# Patient Record
Sex: FEMALE | Race: WHITE | Employment: OTHER
[De-identification: names, ages, dates, MRNs, and addresses within clinical notes are randomized per-mention and may not be internally consistent; named-entity substitution may affect disease eponyms.]

---

## 2023-09-30 ENCOUNTER — HOSPITAL ENCOUNTER (EMERGENCY)
Facility: HOSPITAL | Age: 63
Discharge: HOME | End: 2023-10-01
Attending: EMERGENCY MEDICINE
Payer: COMMERCIAL

## 2023-09-30 ENCOUNTER — APPOINTMENT (OUTPATIENT)
Facility: HOSPITAL | Age: 63
End: 2023-09-30
Payer: COMMERCIAL

## 2023-09-30 VITALS
WEIGHT: 95 LBS | HEIGHT: 65 IN | HEART RATE: 77 BPM | SYSTOLIC BLOOD PRESSURE: 150 MMHG | OXYGEN SATURATION: 97 % | DIASTOLIC BLOOD PRESSURE: 92 MMHG | TEMPERATURE: 98.5 F | BODY MASS INDEX: 15.83 KG/M2 | RESPIRATION RATE: 21 BRPM

## 2023-09-30 DIAGNOSIS — R07.89 OTHER CHEST PAIN: Primary | ICD-10-CM

## 2023-09-30 DIAGNOSIS — R07.89 CHEST WALL PAIN: ICD-10-CM

## 2023-09-30 LAB
ALBUMIN SERPL-MCNC: 3 G/DL (ref 3.4–5)
ALBUMIN/GLOB SERPL: 0.8 (ref 0.8–1.7)
ALP SERPL-CCNC: 217 U/L (ref 45–117)
ALT SERPL-CCNC: 26 U/L (ref 13–56)
ANION GAP SERPL CALC-SCNC: 10 MMOL/L (ref 3–18)
AST SERPL-CCNC: 17 U/L (ref 10–38)
BASOPHILS # BLD: 0.1 K/UL (ref 0–0.1)
BASOPHILS NFR BLD: 1 % (ref 0–2)
BILIRUB SERPL-MCNC: 0.2 MG/DL (ref 0.2–1)
BUN SERPL-MCNC: 66 MG/DL (ref 7–18)
BUN/CREAT SERPL: 14 (ref 12–20)
CALCIUM SERPL-MCNC: 9 MG/DL (ref 8.5–10.1)
CHLORIDE SERPL-SCNC: 106 MMOL/L (ref 100–111)
CO2 SERPL-SCNC: 24 MMOL/L (ref 21–32)
CREAT SERPL-MCNC: 4.61 MG/DL (ref 0.6–1.3)
DIFFERENTIAL METHOD BLD: ABNORMAL
EOSINOPHIL # BLD: 0.4 K/UL (ref 0–0.4)
EOSINOPHIL NFR BLD: 3 % (ref 0–5)
ERYTHROCYTE [DISTWIDTH] IN BLOOD BY AUTOMATED COUNT: 15.9 % (ref 11.6–14.5)
GLOBULIN SER CALC-MCNC: 3.7 G/DL (ref 2–4)
GLUCOSE SERPL-MCNC: 285 MG/DL (ref 74–99)
HCT VFR BLD AUTO: 38.1 % (ref 35–45)
HGB BLD-MCNC: 12.1 G/DL (ref 12–16)
IMM GRANULOCYTES # BLD AUTO: 0 K/UL (ref 0–0.04)
IMM GRANULOCYTES NFR BLD AUTO: 0 % (ref 0–0.5)
LYMPHOCYTES # BLD: 1.1 K/UL (ref 0.9–3.6)
LYMPHOCYTES NFR BLD: 10 % (ref 21–52)
MAGNESIUM SERPL-MCNC: 2.1 MG/DL (ref 1.6–2.6)
MCH RBC QN AUTO: 33.4 PG (ref 24–34)
MCHC RBC AUTO-ENTMCNC: 31.8 G/DL (ref 31–37)
MCV RBC AUTO: 105.2 FL (ref 78–100)
MONOCYTES # BLD: 0.8 K/UL (ref 0.05–1.2)
MONOCYTES NFR BLD: 8 % (ref 3–10)
NEUTS SEG # BLD: 8.7 K/UL (ref 1.8–8)
NEUTS SEG NFR BLD: 78 % (ref 40–73)
NRBC # BLD: 0 K/UL (ref 0–0.01)
NRBC BLD-RTO: 0 PER 100 WBC
PLATELET # BLD AUTO: 204 K/UL (ref 135–420)
PMV BLD AUTO: 10.3 FL (ref 9.2–11.8)
POTASSIUM SERPL-SCNC: 4.3 MMOL/L (ref 3.5–5.5)
PROT SERPL-MCNC: 6.7 G/DL (ref 6.4–8.2)
RBC # BLD AUTO: 3.62 M/UL (ref 4.2–5.3)
SODIUM SERPL-SCNC: 140 MMOL/L (ref 136–145)
TROPONIN I SERPL HS-MCNC: 29 NG/L (ref 0–54)
TROPONIN I SERPL HS-MCNC: 31 NG/L (ref 0–54)
WBC # BLD AUTO: 11.1 K/UL (ref 4.6–13.2)

## 2023-09-30 PROCEDURE — 2580000003 HC RX 258: Performed by: EMERGENCY MEDICINE

## 2023-09-30 PROCEDURE — 83735 ASSAY OF MAGNESIUM: CPT

## 2023-09-30 PROCEDURE — 84484 ASSAY OF TROPONIN QUANT: CPT

## 2023-09-30 PROCEDURE — 85025 COMPLETE CBC W/AUTO DIFF WBC: CPT

## 2023-09-30 PROCEDURE — 99285 EMERGENCY DEPT VISIT HI MDM: CPT

## 2023-09-30 PROCEDURE — 80053 COMPREHEN METABOLIC PANEL: CPT

## 2023-09-30 PROCEDURE — 94762 N-INVAS EAR/PLS OXIMTRY CONT: CPT

## 2023-09-30 PROCEDURE — 71045 X-RAY EXAM CHEST 1 VIEW: CPT

## 2023-09-30 RX ORDER — 0.9 % SODIUM CHLORIDE 0.9 %
500 INTRAVENOUS SOLUTION INTRAVENOUS ONCE
Status: COMPLETED | OUTPATIENT
Start: 2023-09-30 | End: 2023-09-30

## 2023-09-30 RX ADMIN — SODIUM CHLORIDE 500 ML: 9 INJECTION, SOLUTION INTRAVENOUS at 11:56

## 2023-09-30 NOTE — ED NOTES
Report called to [facility] report of discharge and instructions given to [facility], [place] LPN.         [address] RN  09/30/23 1845

## 2023-09-30 NOTE — ED PROVIDER NOTES
[facility] EMERGENCY DEPT  EMERGENCY DEPARTMENT ENCOUNTER    Patient Name: Claus Garcia  MRN: [id number]  YOB: 1960  Provider: Sarah Landry MD  PCP: Gabby Angeles DO   Time/Date of evaluation: 10:58 AM EDT on 9/30/23    History of Presenting Illness     Chief Complaint   Patient presents with    Chest Pain       History Provided By: Patient, EMS, and nursing home report     History Eber Soliman): Calus Garcia is a 60 y.o. female who presents to the emergency department by EMS from the [address] [place] [address] for chest pain ongoing for 1 day. EMS previously administered 1 nitro with no relief and 325 mg of aspirin orally. Patient states the chain is a tightness and a pressure is central and to the sternum no radiation. Nursing Notes were all reviewed and agreed with or any disagreements were addressed in the HPI. Past History     Past Medical History:  Past Medical History:   Diagnosis Date    Arthropathy     left knee    Asthma     seasonal    CAD (coronary artery disease)     Diabetes mellitus (HCC)     type 2- IDDM    Hypertension     Ketoacidosis     Kidney stone     Pyelonephritis     Sepsis ([address])     SVT (supraventricular tachycardia) ([address])     and afib    Urinary retention        Past Surgical History:  Past Surgical History:   Procedure Laterality Date    APPENDECTOMY      CHOLECYSTECTOMY         Family History:  Family History   Problem Relation Age of Onset    Hypertension Mother     Cancer Mother     Hypertension Father     Asthma Father        Social History:       Medications:  No current facility-administered medications for this encounter. No current outpatient medications on file. Allergies:   Allergies   Allergen Reactions    Iodine     Sulfa Antibiotics        Social Determinants of Health:  Social Determinants of Health     Tobacco Use: Not on file   Alcohol Use: Not on file   Financial Resource Strain: Not on file   Food Insecurity: Not on file

## 2023-09-30 NOTE — ED NOTES
Pt voices her chest pain now feels \"different\" describes as \"tight\" and moving up left shoulder. Dr. Jonn Caraballo made aware repeat EKG and lab draw.         [address] RN  09/30/23 9187

## 2023-09-30 NOTE — ED NOTES
Pt states to this nurse that her chest pain started yesterday was given nitro with no relief, she states that she informed the staff at [facility] yesterday. She states that she was instructed by the PA at [facility] she could call 911 herself if she felt that she needed to. Dr. Dinorah Bartlett updated regarding patients comment to the nurse. Pt tearful in room.            [address] RN  09/30/23 2866

## 2023-09-30 NOTE — ED TRIAGE NOTES
Pt brought into er from [facility] for complaints of chest pain x 1 day. Ems administered 1 nitro with no relief and 325mg asa,.

## 2023-10-01 LAB
EKG ATRIAL RATE: 73 BPM
EKG ATRIAL RATE: 77 BPM
EKG DIAGNOSIS: NORMAL
EKG DIAGNOSIS: NORMAL
EKG P AXIS: 16 DEGREES
EKG P AXIS: 21 DEGREES
EKG P-R INTERVAL: 204 MS
EKG P-R INTERVAL: 222 MS
EKG Q-T INTERVAL: 424 MS
EKG Q-T INTERVAL: 426 MS
EKG QRS DURATION: 128 MS
EKG QRS DURATION: 128 MS
EKG QTC CALCULATION (BAZETT): 469 MS
EKG QTC CALCULATION (BAZETT): 479 MS
EKG R AXIS: -39 DEGREES
EKG R AXIS: -42 DEGREES
EKG T AXIS: 86 DEGREES
EKG T AXIS: 87 DEGREES
EKG VENTRICULAR RATE: 73 BPM
EKG VENTRICULAR RATE: 77 BPM

## 2023-12-31 ENCOUNTER — APPOINTMENT (OUTPATIENT)
Facility: HOSPITAL | Age: 63
End: 2023-12-31
Payer: COMMERCIAL

## 2023-12-31 ENCOUNTER — HOSPITAL ENCOUNTER (EMERGENCY)
Facility: HOSPITAL | Age: 63
Discharge: HOME | End: 2023-12-31
Attending: EMERGENCY MEDICINE
Payer: COMMERCIAL

## 2023-12-31 VITALS
BODY MASS INDEX: 17.49 KG/M2 | HEIGHT: 65 IN | DIASTOLIC BLOOD PRESSURE: 77 MMHG | SYSTOLIC BLOOD PRESSURE: 183 MMHG | WEIGHT: 105 LBS | HEART RATE: 66 BPM | TEMPERATURE: 98.5 F | OXYGEN SATURATION: 98 % | RESPIRATION RATE: 18 BRPM

## 2023-12-31 DIAGNOSIS — R07.89 ATYPICAL CHEST PAIN: Primary | ICD-10-CM

## 2023-12-31 DIAGNOSIS — M79.10 COVID-19 LONG HAULER MANIFESTING CHRONIC MUSCLE PAIN: ICD-10-CM

## 2023-12-31 DIAGNOSIS — G89.29 COVID-19 LONG HAULER MANIFESTING CHRONIC MUSCLE PAIN: ICD-10-CM

## 2023-12-31 DIAGNOSIS — U09.9 COVID-19 LONG HAULER MANIFESTING CHRONIC MUSCLE PAIN: ICD-10-CM

## 2023-12-31 LAB
ALBUMIN SERPL-MCNC: 3.1 G/DL (ref 3.4–5)
ALBUMIN/GLOB SERPL: 1 (ref 0.8–1.7)
ALP SERPL-CCNC: 123 U/L (ref 45–117)
ALT SERPL-CCNC: 36 U/L (ref 13–56)
ANION GAP SERPL CALC-SCNC: 7 MMOL/L (ref 3–18)
AST SERPL-CCNC: 39 U/L (ref 10–38)
BASOPHILS # BLD: 0 K/UL (ref 0–0.1)
BASOPHILS NFR BLD: 0 % (ref 0–2)
BILIRUB SERPL-MCNC: 0.4 MG/DL (ref 0.2–1)
BUN SERPL-MCNC: 24 MG/DL (ref 7–18)
BUN/CREAT SERPL: 10 (ref 12–20)
CALCIUM SERPL-MCNC: 8.7 MG/DL (ref 8.5–10.1)
CHLORIDE SERPL-SCNC: 107 MMOL/L (ref 100–111)
CO2 SERPL-SCNC: 24 MMOL/L (ref 21–32)
CREAT SERPL-MCNC: 2.3 MG/DL (ref 0.6–1.3)
DIFFERENTIAL METHOD BLD: ABNORMAL
EKG ATRIAL RATE: 67 BPM
EKG DIAGNOSIS: NORMAL
EKG P AXIS: 41 DEGREES
EKG P-R INTERVAL: 198 MS
EKG Q-T INTERVAL: 408 MS
EKG QRS DURATION: 72 MS
EKG QTC CALCULATION (BAZETT): 431 MS
EKG R AXIS: -81 DEGREES
EKG T AXIS: 84 DEGREES
EKG VENTRICULAR RATE: 67 BPM
EOSINOPHIL # BLD: 0.3 K/UL (ref 0–0.4)
EOSINOPHIL NFR BLD: 2 % (ref 0–5)
ERYTHROCYTE [DISTWIDTH] IN BLOOD BY AUTOMATED COUNT: 18.8 % (ref 11.6–14.5)
GLOBULIN SER CALC-MCNC: 3.2 G/DL (ref 2–4)
GLUCOSE SERPL-MCNC: 164 MG/DL (ref 74–99)
HCT VFR BLD AUTO: 31.5 % (ref 35–45)
HGB BLD-MCNC: 9.8 G/DL (ref 12–16)
IMM GRANULOCYTES # BLD AUTO: 0.3 K/UL (ref 0–0.04)
IMM GRANULOCYTES NFR BLD AUTO: 3 % (ref 0–0.5)
LYMPHOCYTES # BLD: 2.7 K/UL (ref 0.9–3.6)
LYMPHOCYTES NFR BLD: 22 % (ref 21–52)
MAGNESIUM SERPL-MCNC: 1.9 MG/DL (ref 1.6–2.6)
MCH RBC QN AUTO: 33.3 PG (ref 24–34)
MCHC RBC AUTO-ENTMCNC: 31.1 G/DL (ref 31–37)
MCV RBC AUTO: 107.1 FL (ref 78–100)
MONOCYTES # BLD: 0.9 K/UL (ref 0.05–1.2)
MONOCYTES NFR BLD: 8 % (ref 3–10)
NEUTS SEG # BLD: 7.9 K/UL (ref 1.8–8)
NEUTS SEG NFR BLD: 65 % (ref 40–73)
NRBC # BLD: 0.02 K/UL (ref 0–0.01)
NRBC BLD-RTO: 0.2 PER 100 WBC
NT PRO BNP: 9680 PG/ML (ref 0–900)
PLATELET # BLD AUTO: 208 K/UL (ref 135–420)
PMV BLD AUTO: 10 FL (ref 9.2–11.8)
POTASSIUM SERPL-SCNC: 4.8 MMOL/L (ref 3.5–5.5)
PROT SERPL-MCNC: 6.3 G/DL (ref 6.4–8.2)
RBC # BLD AUTO: 2.94 M/UL (ref 4.2–5.3)
SODIUM SERPL-SCNC: 138 MMOL/L (ref 136–145)
TROPONIN I SERPL HS-MCNC: 49 NG/L (ref 0–54)
WBC # BLD AUTO: 12.1 K/UL (ref 4.6–13.2)

## 2023-12-31 PROCEDURE — 93005 ELECTROCARDIOGRAM TRACING: CPT | Performed by: EMERGENCY MEDICINE

## 2023-12-31 PROCEDURE — 6370000000 HC RX 637 (ALT 250 FOR IP): Performed by: EMERGENCY MEDICINE

## 2023-12-31 PROCEDURE — 80053 COMPREHEN METABOLIC PANEL: CPT

## 2023-12-31 PROCEDURE — 85025 COMPLETE CBC W/AUTO DIFF WBC: CPT

## 2023-12-31 PROCEDURE — 71045 X-RAY EXAM CHEST 1 VIEW: CPT

## 2023-12-31 PROCEDURE — 99285 EMERGENCY DEPT VISIT HI MDM: CPT

## 2023-12-31 PROCEDURE — 83880 ASSAY OF NATRIURETIC PEPTIDE: CPT

## 2023-12-31 PROCEDURE — 84484 ASSAY OF TROPONIN QUANT: CPT

## 2023-12-31 PROCEDURE — 83735 ASSAY OF MAGNESIUM: CPT

## 2023-12-31 RX ORDER — CYCLOBENZAPRINE HCL 10 MG
10 TABLET ORAL
Status: DISCONTINUED | OUTPATIENT
Start: 2023-12-31 | End: 2023-12-31 | Stop reason: HOSPADM

## 2023-12-31 RX ORDER — MORPHINE SULFATE 2 MG/ML
2 INJECTION, SOLUTION INTRAMUSCULAR; INTRAVENOUS
Status: DISCONTINUED | OUTPATIENT
Start: 2023-12-31 | End: 2023-12-31 | Stop reason: HOSPADM

## 2023-12-31 RX ORDER — ACETAMINOPHEN 325 MG/1
650 TABLET ORAL
Status: DISCONTINUED | OUTPATIENT
Start: 2023-12-31 | End: 2023-12-31 | Stop reason: HOSPADM

## 2023-12-31 RX ORDER — TRAMADOL HYDROCHLORIDE 50 MG/1
50 TABLET ORAL
Status: COMPLETED | OUTPATIENT
Start: 2023-12-31 | End: 2023-12-31

## 2023-12-31 RX ADMIN — TRAMADOL HYDROCHLORIDE 50 MG: 50 TABLET ORAL at 05:34

## 2023-12-31 ASSESSMENT — PAIN SCALES - GENERAL: PAINLEVEL_OUTOF10: 8

## 2023-12-31 ASSESSMENT — PAIN - FUNCTIONAL ASSESSMENT: PAIN_FUNCTIONAL_ASSESSMENT: 0-10

## 2023-12-31 NOTE — ED TRIAGE NOTES
Pt arrives via ems c/o chest pain x24 hours. Pt is a resident at [facility] rn give 2 nitro, 1 hydralazine, and 1 med they forgot the name of with no relief.

## 2023-12-31 NOTE — DISCHARGE INSTRUCTIONS
Patient has continued musculoskeletal chest pain associated with her COVID infection.  Would highly recommend more aggressive treatment of her discomfort at least consider using muscle relaxants to augment Tylenol for symptoms.

## 2023-12-31 NOTE — ED PROVIDER NOTES
EMERGENCY DEPARTMENT HISTORY AND PHYSICAL EXAM    Date: 12/31/2023  Patient Name: Rebeka Villegas    History of Presenting Illness     Chief Complaint   Patient presents with    Chest Pain         History Provided By: Patient and EMS    Additional History (Context):   4:57 AM EST  Rebeka Villegas is a 63 y.o. female with PMHX of hypertension, diabetes, CAD, chronic chest pain, COVID-19 infection, end-stage renal disease 3 days with dialysis kidney stones with ESBL E. coli infection, SVT, asthma who presents to the emergency department C/O Chest pain.  Paramedics were called to nursing home/refit hip facility for patient complaining of chest pain.  They found her sleeping resting comfortably easily awoken upon their arrival.  Duration of symptoms reportedly lasting over 24 hours.  EKG performed in the field shows sinus rhythm with PAC no ischemia.  Heart rate is normal but blood pressure elevated 160s to 180s over 90s in the field.  She complains of 2 out of 10 pain.  Patient transported and delivered bed 1 without incident.  Patient was given hydralazine x 1 and nitroglycerin x 2 prior to transport.  Patient describes chest pain as pain central but greater intensity 2 out of 10 nonexertional without radiation.  There is no increasing or decreasing factors.  Review of records patient had a pharmacologic nuclear stress test performed December 28, 3 days ago [facility] showing no evidence of inducible ischemia.  Patient was admitted December 19 for chest pain COVID-positive.  She stayed until December 29 essentially 24-36 hrs. ago discharged after unremarkable standard workup.  Patient has a dialysis shunt to the right upper chest wall.    Social History  No smoking drinking or drugs    Family History  Both mother and father with hypertension, mother with glaucoma and cataracts.  Hypertension, cancer, other family history listed below    PCP: Ronan Correia, DO    No current facility-administered medications for  their visit.         Diagnosis and Disposition       DISCHARGE NOTE:  ***  Rebeka Villegas's  results have been reviewed with her.  She has been counseled regarding her diagnosis, treatment, and plan.  She verbally conveys understanding and agreement of the signs, symptoms, diagnosis, treatment and prognosis and additionally agrees to follow up as discussed.  She also agrees with the care-plan and conveys that all of her questions have been answered.  I have also provided discharge instructions for her that include: educational information regarding their diagnosis and treatment, and list of reasons why they would want to return to the ED prior to their follow-up appointment, should her condition change. She has been provided with education for proper emergency department utilization.     CLINICAL IMPRESSION:    No diagnosis found.    PLAN:  1. D/C Home  2.      Medication List      You have not been prescribed any medications.       3. [facility]  _______________________________    This note was partially transcribed via voice recognition software. Although efforts have been made to catch any discrepancies, it may contain sound alike words, grammatical errors, or nonsensical words.

## 2024-01-01 ENCOUNTER — HOME CARE VISIT (OUTPATIENT)
Age: 64
End: 2024-01-01
Payer: MEDICARE

## 2024-01-01 VITALS
OXYGEN SATURATION: 100 % | TEMPERATURE: 97.3 F | SYSTOLIC BLOOD PRESSURE: 101 MMHG | HEART RATE: 96 BPM | RESPIRATION RATE: 14 BRPM | DIASTOLIC BLOOD PRESSURE: 64 MMHG

## 2024-01-01 VITALS
TEMPERATURE: 97.5 F | DIASTOLIC BLOOD PRESSURE: 96 MMHG | RESPIRATION RATE: 12 BRPM | OXYGEN SATURATION: 100 % | SYSTOLIC BLOOD PRESSURE: 149 MMHG | HEART RATE: 81 BPM

## 2024-01-01 VITALS
OXYGEN SATURATION: 97 % | HEART RATE: 93 BPM | DIASTOLIC BLOOD PRESSURE: 102 MMHG | RESPIRATION RATE: 12 BRPM | SYSTOLIC BLOOD PRESSURE: 162 MMHG | TEMPERATURE: 97.3 F

## 2024-01-01 VITALS
HEART RATE: 85 BPM | SYSTOLIC BLOOD PRESSURE: 145 MMHG | DIASTOLIC BLOOD PRESSURE: 93 MMHG | RESPIRATION RATE: 15 BRPM | OXYGEN SATURATION: 100 % | TEMPERATURE: 97.3 F

## 2024-01-01 VITALS
TEMPERATURE: 97.4 F | SYSTOLIC BLOOD PRESSURE: 129 MMHG | DIASTOLIC BLOOD PRESSURE: 74 MMHG | HEART RATE: 76 BPM | OXYGEN SATURATION: 100 % | RESPIRATION RATE: 16 BRPM

## 2024-01-01 PROCEDURE — G0299 HHS/HOSPICE OF RN EA 15 MIN: HCPCS

## 2024-01-01 PROCEDURE — G0156 HHCP-SVS OF AIDE,EA 15 MIN: HCPCS

## 2024-01-01 PROCEDURE — G0155 HHCP-SVS OF CSW,EA 15 MIN: HCPCS

## 2024-01-01 ASSESSMENT — ENCOUNTER SYMPTOMS
BOWEL INCONTINENCE: 1

## 2024-01-14 PROBLEM — N18.4 CKD (CHRONIC KIDNEY DISEASE) STAGE 4, GFR 15-29 ML/MIN (HCC): Status: ACTIVE | Noted: 2022-11-03

## 2024-01-14 PROBLEM — I50.33 ACUTE ON CHRONIC DIASTOLIC HEART FAILURE (HCC): Status: ACTIVE | Noted: 2022-03-11

## 2024-01-14 PROBLEM — E55.9 VITAMIN D DEFICIENCY: Status: ACTIVE | Noted: 2022-07-08

## 2024-01-14 PROBLEM — J96.01 ACUTE HYPOXEMIC RESPIRATORY FAILURE (HCC): Status: ACTIVE | Noted: 2022-07-21

## 2024-01-14 PROBLEM — D63.8 ANEMIA OF CHRONIC DISEASE: Status: ACTIVE | Noted: 2023-05-25

## 2024-01-14 PROBLEM — T50.2X5A DIURETIC-INDUCED HYPOKALEMIA: Status: ACTIVE | Noted: 2024-01-14

## 2024-01-14 PROBLEM — I50.32 CHRONIC DIASTOLIC HEART FAILURE (HCC): Status: ACTIVE | Noted: 2022-03-31

## 2024-01-14 PROBLEM — E87.6 DIURETIC-INDUCED HYPOKALEMIA: Status: ACTIVE | Noted: 2024-01-14

## 2024-01-14 PROBLEM — E11.10 DIABETIC KETOACIDOSIS WITHOUT COMA ASSOCIATED WITH TYPE 2 DIABETES MELLITUS (HCC): Status: ACTIVE | Noted: 2018-08-15

## 2024-01-14 PROBLEM — N10 ACUTE PYELONEPHRITIS: Status: ACTIVE | Noted: 2017-05-19

## 2024-01-14 PROBLEM — R79.89 ELEVATED BRAIN NATRIURETIC PEPTIDE (BNP) LEVEL: Status: ACTIVE | Noted: 2022-02-08

## 2024-01-14 PROBLEM — I25.10 CORONARY ATHEROSCLEROSIS: Status: ACTIVE | Noted: 2023-09-04

## 2024-01-14 PROBLEM — J81.1 PULMONARY EDEMA: Status: ACTIVE | Noted: 2023-08-23

## 2024-01-14 PROBLEM — K21.9 GERD (GASTROESOPHAGEAL REFLUX DISEASE): Status: ACTIVE | Noted: 2023-06-25

## 2024-01-14 PROBLEM — E78.5 HYPERLIPIDEMIA: Status: ACTIVE | Noted: 2022-03-11

## 2024-01-14 PROBLEM — I50.9 CHF (CONGESTIVE HEART FAILURE) (HCC): Status: ACTIVE | Noted: 2023-02-27

## 2024-01-14 PROBLEM — I62.9 INTRACRANIAL HEMORRHAGE (HCC): Status: ACTIVE | Noted: 2023-11-13

## 2024-01-14 PROBLEM — N20.0 NEPHROLITHIASIS: Status: ACTIVE | Noted: 2023-07-05

## 2024-01-14 PROBLEM — I47.10 PSVT (PAROXYSMAL SUPRAVENTRICULAR TACHYCARDIA): Status: ACTIVE | Noted: 2024-01-14

## 2024-01-14 PROBLEM — U07.1 COVID-19: Status: ACTIVE | Noted: 2023-12-19

## 2024-01-14 PROBLEM — E11.65 UNCONTROLLED TYPE 2 DIABETES MELLITUS WITH HYPERGLYCEMIA (HCC): Status: ACTIVE | Noted: 2022-04-02

## 2024-01-14 PROBLEM — N17.9 AKI (ACUTE KIDNEY INJURY) (HCC): Status: ACTIVE | Noted: 2018-08-15

## 2024-03-19 ENCOUNTER — APPOINTMENT (OUTPATIENT)
Facility: HOSPITAL | Age: 64
End: 2024-03-19
Payer: COMMERCIAL

## 2024-03-19 ENCOUNTER — HOSPITAL ENCOUNTER (EMERGENCY)
Facility: HOSPITAL | Age: 64
Discharge: HOME | End: 2024-03-19
Attending: EMERGENCY MEDICINE
Payer: COMMERCIAL

## 2024-03-19 VITALS
BODY MASS INDEX: 19.44 KG/M2 | TEMPERATURE: 98.4 F | DIASTOLIC BLOOD PRESSURE: 74 MMHG | WEIGHT: 116.84 LBS | OXYGEN SATURATION: 99 % | SYSTOLIC BLOOD PRESSURE: 115 MMHG | RESPIRATION RATE: 14 BRPM | HEART RATE: 54 BPM

## 2024-03-19 DIAGNOSIS — R07.9 CHEST PAIN, UNSPECIFIED TYPE: Primary | ICD-10-CM

## 2024-03-19 DIAGNOSIS — N18.6 ESRD ON HEMODIALYSIS (HCC): ICD-10-CM

## 2024-03-19 DIAGNOSIS — Z99.2 ESRD ON HEMODIALYSIS (HCC): ICD-10-CM

## 2024-03-19 DIAGNOSIS — N30.01 ACUTE CYSTITIS WITH HEMATURIA: ICD-10-CM

## 2024-03-19 LAB
ALBUMIN SERPL-MCNC: 2.1 G/DL (ref 3.4–5)
ALBUMIN/GLOB SERPL: 0.6 (ref 0.8–1.7)
ALP SERPL-CCNC: 176 U/L (ref 45–117)
ALT SERPL-CCNC: 11 U/L (ref 13–56)
ANION GAP SERPL CALC-SCNC: 6 MMOL/L (ref 3–18)
APPEARANCE UR: CLEAR
AST SERPL-CCNC: 16 U/L (ref 10–38)
BACTERIA URNS QL MICRO: ABNORMAL /HPF
BASOPHILS # BLD: 0.1 K/UL (ref 0–0.1)
BASOPHILS NFR BLD: 1 % (ref 0–2)
BILIRUB SERPL-MCNC: 0.3 MG/DL (ref 0.2–1)
BILIRUB UR QL: NEGATIVE
BUN SERPL-MCNC: 78 MG/DL (ref 7–18)
BUN/CREAT SERPL: 18 (ref 12–20)
CALCIUM SERPL-MCNC: 9 MG/DL (ref 8.5–10.1)
CHLORIDE SERPL-SCNC: 107 MMOL/L (ref 100–111)
CO2 SERPL-SCNC: 28 MMOL/L (ref 21–32)
COLOR UR: YELLOW
CREAT SERPL-MCNC: 4.22 MG/DL (ref 0.6–1.3)
DIFFERENTIAL METHOD BLD: ABNORMAL
EOSINOPHIL # BLD: 0.2 K/UL (ref 0–0.4)
EOSINOPHIL NFR BLD: 1 % (ref 0–5)
EPITH CASTS URNS QL MICRO: ABNORMAL /LPF (ref 0–5)
ERYTHROCYTE [DISTWIDTH] IN BLOOD BY AUTOMATED COUNT: 14.9 % (ref 11.6–14.5)
GLOBULIN SER CALC-MCNC: 3.8 G/DL (ref 2–4)
GLUCOSE BLD STRIP.AUTO-MCNC: 104 MG/DL (ref 70–110)
GLUCOSE SERPL-MCNC: 117 MG/DL (ref 74–99)
GLUCOSE UR STRIP.AUTO-MCNC: 250 MG/DL
HCT VFR BLD AUTO: 36.1 % (ref 35–45)
HGB BLD-MCNC: 11.4 G/DL (ref 12–16)
HGB UR QL STRIP: ABNORMAL
IMM GRANULOCYTES # BLD AUTO: 0.1 K/UL (ref 0–0.04)
IMM GRANULOCYTES NFR BLD AUTO: 1 % (ref 0–0.5)
KETONES UR QL STRIP.AUTO: NEGATIVE MG/DL
LEUKOCYTE ESTERASE UR QL STRIP.AUTO: ABNORMAL
LYMPHOCYTES # BLD: 1.2 K/UL (ref 0.9–3.6)
LYMPHOCYTES NFR BLD: 11 % (ref 21–52)
MCH RBC QN AUTO: 30.9 PG (ref 24–34)
MCHC RBC AUTO-ENTMCNC: 31.6 G/DL (ref 31–37)
MCV RBC AUTO: 97.8 FL (ref 78–100)
MONOCYTES # BLD: 0.9 K/UL (ref 0.05–1.2)
MONOCYTES NFR BLD: 8 % (ref 3–10)
NEUTS SEG # BLD: 8.8 K/UL (ref 1.8–8)
NEUTS SEG NFR BLD: 79 % (ref 40–73)
NITRITE UR QL STRIP.AUTO: NEGATIVE
NRBC # BLD: 0 K/UL (ref 0–0.01)
NRBC BLD-RTO: 0 PER 100 WBC
PH UR STRIP: 6.5 (ref 5–8)
PLATELET # BLD AUTO: 214 K/UL (ref 135–420)
PMV BLD AUTO: 11.1 FL (ref 9.2–11.8)
POTASSIUM SERPL-SCNC: 4.1 MMOL/L (ref 3.5–5.5)
PROT SERPL-MCNC: 5.9 G/DL (ref 6.4–8.2)
PROT UR STRIP-MCNC: 300 MG/DL
RBC # BLD AUTO: 3.69 M/UL (ref 4.2–5.3)
RBC #/AREA URNS HPF: ABNORMAL /HPF (ref 0–5)
SODIUM SERPL-SCNC: 141 MMOL/L (ref 136–145)
SP GR UR REFRACTOMETRY: 1.01 (ref 1–1.03)
TROPONIN I SERPL HS-MCNC: 32 NG/L (ref 0–54)
TROPONIN I SERPL HS-MCNC: 36 NG/L (ref 0–54)
UROBILINOGEN UR QL STRIP.AUTO: 0.2 EU/DL (ref 0.2–1)
WBC # BLD AUTO: 11.2 K/UL (ref 4.6–13.2)
WBC URNS QL MICRO: ABNORMAL /HPF (ref 0–5)
YEAST URNS QL MICRO: ABNORMAL

## 2024-03-19 PROCEDURE — 99285 EMERGENCY DEPT VISIT HI MDM: CPT

## 2024-03-19 PROCEDURE — 6360000002 HC RX W HCPCS: Performed by: PHYSICIAN ASSISTANT

## 2024-03-19 PROCEDURE — 71045 X-RAY EXAM CHEST 1 VIEW: CPT

## 2024-03-19 PROCEDURE — 87086 URINE CULTURE/COLONY COUNT: CPT

## 2024-03-19 PROCEDURE — 82962 GLUCOSE BLOOD TEST: CPT

## 2024-03-19 PROCEDURE — 2580000003 HC RX 258: Performed by: PHYSICIAN ASSISTANT

## 2024-03-19 PROCEDURE — 96365 THER/PROPH/DIAG IV INF INIT: CPT

## 2024-03-19 PROCEDURE — 80053 COMPREHEN METABOLIC PANEL: CPT

## 2024-03-19 PROCEDURE — 70450 CT HEAD/BRAIN W/O DYE: CPT

## 2024-03-19 PROCEDURE — 93005 ELECTROCARDIOGRAM TRACING: CPT | Performed by: EMERGENCY MEDICINE

## 2024-03-19 PROCEDURE — 81001 URINALYSIS AUTO W/SCOPE: CPT

## 2024-03-19 PROCEDURE — 93005 ELECTROCARDIOGRAM TRACING: CPT | Performed by: PHYSICIAN ASSISTANT

## 2024-03-19 PROCEDURE — 84484 ASSAY OF TROPONIN QUANT: CPT

## 2024-03-19 PROCEDURE — 85025 COMPLETE CBC W/AUTO DIFF WBC: CPT

## 2024-03-19 RX ORDER — CEPHALEXIN 500 MG/1
500 CAPSULE ORAL 2 TIMES DAILY
Qty: 14 CAPSULE | Refills: 0 | Status: ON HOLD | OUTPATIENT
Start: 2024-03-19

## 2024-03-19 RX ADMIN — CEFTRIAXONE 1000 MG: 1 INJECTION, POWDER, FOR SOLUTION INTRAMUSCULAR; INTRAVENOUS at 17:51

## 2024-03-19 ASSESSMENT — LIFESTYLE VARIABLES
HOW OFTEN DO YOU HAVE A DRINK CONTAINING ALCOHOL: NEVER
HOW MANY STANDARD DRINKS CONTAINING ALCOHOL DO YOU HAVE ON A TYPICAL DAY: PATIENT DOES NOT DRINK

## 2024-03-19 ASSESSMENT — PAIN - FUNCTIONAL ASSESSMENT
PAIN_FUNCTIONAL_ASSESSMENT: NONE - DENIES PAIN
PAIN_FUNCTIONAL_ASSESSMENT: NONE - DENIES PAIN

## 2024-03-19 NOTE — ED PROVIDER NOTES
[facility] EMERGENCY DEPT  EMERGENCY DEPARTMENT ENCOUNTER       Pt Name: Rebeka Villegas  MRN: [id number]  Birthdate 1960  Date of evaluation: 3/19/2024  PCP: Lester Minor MD  Note Started: 7:39 PM 3/19/24     CHIEF COMPLAINT       Chief Complaint   Patient presents with    Chest Pain        HISTORY OF PRESENT ILLNESS: 1 or more elements      History From: Patient  HPI Limitations: None  Chronic Conditions: paroxysmal A-fib, CKD, diabetes, ESRD on HD (T/Th/Sat)  Social Determinants affecting Dx or Tx: none      Rebeka Villegas is a 63 y.o. female who presents to ED c/o sharp chest pain that radiated to her right shoulder which began while patient was about 30 minutes into dialysis today.  Per EMS they were told by staff that patient is at her normal baseline mental status.  Patient denies any pain in her chest now.  Patient is poor historian, states her mouth is dry and just wants water.  She does report some pain in her legs but that is chronic.  Pt is bedbound, resides in [facility], left-sided hemiplegia with significant right-sided weakness as well.     Nursing Notes were all reviewed and agreed with or any disagreements were addressed in the HPI.    PAST HISTORY     Past Medical History:  Past Medical History:   Diagnosis Date    Arthropathy     left knee    Asthma     seasonal    Atrial fibrillation (HCC)     CAD (coronary artery disease)     Cerebral artery occlusion with cerebral infarction (HCC)     Chronic kidney disease     Diabetes mellitus (HCC)     type 2- IDDM    Hemiplegia affecting dominant side, post-stroke (HCC)     Hypertension     Ketoacidosis     Kidney stone     Pyelonephritis     Sepsis (HCC)     SVT (supraventricular tachycardia)     and afib    Urinary retention        Past Surgical History:  Past Surgical History:   Procedure Laterality Date    APPENDECTOMY      CHOLECYSTECTOMY      XR MIDLINE EQUAL OR GREATER THAN 5 YEARS  7/25/2022    XR MIDLINE EQUAL OR GREATER

## 2024-03-19 NOTE — ED TRIAGE NOTES
Pt to room 2 from dialysis for evaluation of chest pain, and pain to right shoulder. Pt completed about 30 mins of her session when she felt sharp pain to middle of chest. EMS reports pt had similar pain last week during dialysis.  Denies SOB denies nausea.

## 2024-03-21 ENCOUNTER — APPOINTMENT (OUTPATIENT)
Facility: HOSPITAL | Age: 64
DRG: 871 | End: 2024-03-21
Payer: MEDICARE

## 2024-03-21 ENCOUNTER — HOSPITAL ENCOUNTER (EMERGENCY)
Facility: HOSPITAL | Age: 64
Discharge: HOME | DRG: 871 | End: 2024-03-22
Payer: MEDICARE

## 2024-03-21 DIAGNOSIS — B37.41 CANDIDA CYSTITIS: ICD-10-CM

## 2024-03-21 DIAGNOSIS — N39.0 ACUTE UTI: ICD-10-CM

## 2024-03-21 DIAGNOSIS — R60.0 EDEMA OF RIGHT UPPER ARM: ICD-10-CM

## 2024-03-21 DIAGNOSIS — R33.9 URINARY RETENTION: Primary | ICD-10-CM

## 2024-03-21 LAB
ALBUMIN SERPL-MCNC: 2.1 G/DL (ref 3.4–5)
ALBUMIN/GLOB SERPL: 0.5 (ref 0.8–1.7)
ALP SERPL-CCNC: 179 U/L (ref 45–117)
ALT SERPL-CCNC: 13 U/L (ref 13–56)
ANION GAP SERPL CALC-SCNC: 9 MMOL/L (ref 3–18)
APPEARANCE UR: ABNORMAL
AST SERPL-CCNC: 21 U/L (ref 10–38)
BACTERIA SPEC CULT: NORMAL
BACTERIA URNS QL MICRO: ABNORMAL /HPF
BASOPHILS # BLD: 0.1 K/UL (ref 0–0.1)
BASOPHILS NFR BLD: 0 % (ref 0–2)
BILIRUB SERPL-MCNC: 0.5 MG/DL (ref 0.2–1)
BILIRUB UR QL: NEGATIVE
BUN SERPL-MCNC: 50 MG/DL (ref 7–18)
BUN/CREAT SERPL: 15 (ref 12–20)
CALCIUM SERPL-MCNC: 8.8 MG/DL (ref 8.5–10.1)
CHLORIDE SERPL-SCNC: 107 MMOL/L (ref 100–111)
CO2 SERPL-SCNC: 22 MMOL/L (ref 21–32)
COLOR UR: YELLOW
CREAT SERPL-MCNC: 3.36 MG/DL (ref 0.6–1.3)
DIFFERENTIAL METHOD BLD: ABNORMAL
EOSINOPHIL # BLD: 0.1 K/UL (ref 0–0.4)
EOSINOPHIL NFR BLD: 1 % (ref 0–5)
EPITH CASTS URNS QL MICRO: ABNORMAL /LPF (ref 0–5)
ERYTHROCYTE [DISTWIDTH] IN BLOOD BY AUTOMATED COUNT: 15.1 % (ref 11.6–14.5)
FLUAV RNA SPEC QL NAA+PROBE: NOT DETECTED
FLUBV RNA SPEC QL NAA+PROBE: NOT DETECTED
GLOBULIN SER CALC-MCNC: 4.6 G/DL (ref 2–4)
GLUCOSE SERPL-MCNC: 179 MG/DL (ref 74–99)
GLUCOSE UR STRIP.AUTO-MCNC: NEGATIVE MG/DL
HCT VFR BLD AUTO: 35.1 % (ref 35–45)
HGB BLD-MCNC: 10.9 G/DL (ref 12–16)
HGB UR QL STRIP: ABNORMAL
IMM GRANULOCYTES # BLD AUTO: 0 K/UL (ref 0–0.04)
IMM GRANULOCYTES NFR BLD AUTO: 0 % (ref 0–0.5)
KETONES UR QL STRIP.AUTO: NEGATIVE MG/DL
LEUKOCYTE ESTERASE UR QL STRIP.AUTO: ABNORMAL
LYMPHOCYTES # BLD: 1.2 K/UL (ref 0.9–3.6)
LYMPHOCYTES NFR BLD: 10 % (ref 21–52)
MAGNESIUM SERPL-MCNC: 1.9 MG/DL (ref 1.6–2.6)
MCH RBC QN AUTO: 30.5 PG (ref 24–34)
MCHC RBC AUTO-ENTMCNC: 31.1 G/DL (ref 31–37)
MCV RBC AUTO: 98.3 FL (ref 78–100)
MONOCYTES # BLD: 0.8 K/UL (ref 0.05–1.2)
MONOCYTES NFR BLD: 7 % (ref 3–10)
NEUTS SEG # BLD: 9.1 K/UL (ref 1.8–8)
NEUTS SEG NFR BLD: 82 % (ref 40–73)
NITRITE UR QL STRIP.AUTO: NEGATIVE
NRBC # BLD: 0 K/UL (ref 0–0.01)
NRBC BLD-RTO: 0 PER 100 WBC
NT PRO BNP: 7816 PG/ML (ref 0–900)
PH UR STRIP: 5 (ref 5–8)
PLATELET # BLD AUTO: 176 K/UL (ref 135–420)
PMV BLD AUTO: 11.6 FL (ref 9.2–11.8)
POTASSIUM SERPL-SCNC: 4.9 MMOL/L (ref 3.5–5.5)
PROT SERPL-MCNC: 6.7 G/DL (ref 6.4–8.2)
PROT UR STRIP-MCNC: 300 MG/DL
RBC # BLD AUTO: 3.57 M/UL (ref 4.2–5.3)
RBC #/AREA URNS HPF: ABNORMAL /HPF (ref 0–5)
SARS-COV-2 RNA RESP QL NAA+PROBE: NOT DETECTED
SERVICE CMNT-IMP: NORMAL
SODIUM SERPL-SCNC: 138 MMOL/L (ref 136–145)
SP GR UR REFRACTOMETRY: 1.01 (ref 1–1.03)
TROPONIN I SERPL HS-MCNC: 24 NG/L (ref 0–54)
TROPONIN I SERPL HS-MCNC: 28 NG/L (ref 0–54)
UROBILINOGEN UR QL STRIP.AUTO: 0.2 EU/DL (ref 0.2–1)
WBC # BLD AUTO: 11.2 K/UL (ref 4.6–13.2)
WBC URNS QL MICRO: ABNORMAL /HPF (ref 0–5)
YEAST URNS QL MICRO: ABNORMAL

## 2024-03-21 PROCEDURE — 99285 EMERGENCY DEPT VISIT HI MDM: CPT

## 2024-03-21 PROCEDURE — 83880 ASSAY OF NATRIURETIC PEPTIDE: CPT

## 2024-03-21 PROCEDURE — 93971 EXTREMITY STUDY: CPT

## 2024-03-21 PROCEDURE — 84484 ASSAY OF TROPONIN QUANT: CPT

## 2024-03-21 PROCEDURE — 87186 SC STD MICRODIL/AGAR DIL: CPT

## 2024-03-21 PROCEDURE — 85025 COMPLETE CBC W/AUTO DIFF WBC: CPT

## 2024-03-21 PROCEDURE — 93005 ELECTROCARDIOGRAM TRACING: CPT | Performed by: EMERGENCY MEDICINE

## 2024-03-21 PROCEDURE — 51702 INSERT TEMP BLADDER CATH: CPT

## 2024-03-21 PROCEDURE — 80053 COMPREHEN METABOLIC PANEL: CPT

## 2024-03-21 PROCEDURE — 81001 URINALYSIS AUTO W/SCOPE: CPT

## 2024-03-21 PROCEDURE — 83735 ASSAY OF MAGNESIUM: CPT

## 2024-03-21 PROCEDURE — 71045 X-RAY EXAM CHEST 1 VIEW: CPT

## 2024-03-21 PROCEDURE — 87088 URINE BACTERIA CULTURE: CPT

## 2024-03-21 PROCEDURE — 96361 HYDRATE IV INFUSION ADD-ON: CPT

## 2024-03-21 PROCEDURE — 2580000003 HC RX 258: Performed by: EMERGENCY MEDICINE

## 2024-03-21 PROCEDURE — 87086 URINE CULTURE/COLONY COUNT: CPT

## 2024-03-21 PROCEDURE — 74176 CT ABD & PELVIS W/O CONTRAST: CPT

## 2024-03-21 PROCEDURE — 87636 SARSCOV2 & INF A&B AMP PRB: CPT

## 2024-03-21 RX ORDER — 0.9 % SODIUM CHLORIDE 0.9 %
250 INTRAVENOUS SOLUTION INTRAVENOUS ONCE
Status: COMPLETED | OUTPATIENT
Start: 2024-03-21 | End: 2024-03-21

## 2024-03-21 RX ORDER — MORPHINE SULFATE 2 MG/ML
2 INJECTION, SOLUTION INTRAMUSCULAR; INTRAVENOUS
Status: COMPLETED | OUTPATIENT
Start: 2024-03-21 | End: 2024-03-22

## 2024-03-21 RX ADMIN — SODIUM CHLORIDE 250 ML: 9 INJECTION, SOLUTION INTRAVENOUS at 21:49

## 2024-03-21 ASSESSMENT — ENCOUNTER SYMPTOMS
SHORTNESS OF BREATH: 0
EYES NEGATIVE: 1
ALLERGIC/IMMUNOLOGIC NEGATIVE: 1
GASTROINTESTINAL NEGATIVE: 1

## 2024-03-21 NOTE — ED PROVIDER NOTES
[facility] EMERGENCY DEPT  EMERGENCY DEPARTMENT ENCOUNTER      Pt Name: Rebeka Villegas  MRN: [id number]  Birthdate 1960  Date of evaluation: 3/21/2024  Provider: ROLO Olson  1:04 AM    CHIEF COMPLAINT       Chief Complaint   Patient presents with    Arm Swelling         HISTORY OF PRESENT ILLNESS    Rebeka Villegas is a 63 y.o. female who presents to the emergency department with EMS.  Patient is a resident at [facility].  She was at dialysis and a call was made for 911 because she did not look well.  Patient was just recently seen and discharged from this facility for chest pain nonspecific.  EMS asked her if she was having any discomfort and she said yes where her chest but no more information.  She did not have a negative dialysis.  This EMS team is very familiar with patient and states that her right upper extremity appears much more swollen than her left.  Right side of her chest is where her dialysis catheter is placed.    HPI    Nursing Notes were reviewed.    REVIEW OF SYSTEMS       Review of Systems   Unable to perform ROS: Dementia   Constitutional:  Negative for fever.   HENT: Negative.     Eyes: Negative.    Respiratory:  Negative for shortness of breath.    Cardiovascular:  Positive for chest pain.   Gastrointestinal: Negative.    Endocrine: Negative.    Genitourinary: Negative.    Musculoskeletal: Negative.    Skin: Negative.    Allergic/Immunologic: Negative.    Neurological: Negative.    Hematological: Negative.    Psychiatric/Behavioral: Negative.         Except as noted above the remainder of the review of systems was reviewed and negative.       PAST MEDICAL HISTORY     Past Medical History:   Diagnosis Date    Arthropathy     left knee    Asthma     seasonal    Atrial fibrillation (HCC)     CAD (coronary artery disease)     Cerebral artery occlusion with cerebral infarction (HCC)     Chronic kidney disease     Diabetes mellitus (HCC)     type 2- IDDM    Hemiplegia affecting

## 2024-03-21 NOTE — ED TRIAGE NOTES
Patient presented to the ed with r arm swelling. Pt recently had a TDC placed on the right side and the arm swelling is new for the patient. Patient at this time not responsive connected to monitor. Care ongoing.

## 2024-03-22 VITALS
DIASTOLIC BLOOD PRESSURE: 71 MMHG | TEMPERATURE: 98.4 F | BODY MASS INDEX: 19.33 KG/M2 | OXYGEN SATURATION: 98 % | HEART RATE: 66 BPM | HEIGHT: 65 IN | SYSTOLIC BLOOD PRESSURE: 168 MMHG | WEIGHT: 116 LBS | RESPIRATION RATE: 14 BRPM

## 2024-03-22 LAB
ECHO BSA: 1.55 M2
EKG ATRIAL RATE: 66 BPM
EKG ATRIAL RATE: 68 BPM
EKG DIAGNOSIS: NORMAL
EKG DIAGNOSIS: NORMAL
EKG P AXIS: 63 DEGREES
EKG P AXIS: 84 DEGREES
EKG P-R INTERVAL: 198 MS
EKG P-R INTERVAL: 220 MS
EKG Q-T INTERVAL: 406 MS
EKG Q-T INTERVAL: 416 MS
EKG QRS DURATION: 82 MS
EKG QRS DURATION: 84 MS
EKG QTC CALCULATION (BAZETT): 431 MS
EKG QTC CALCULATION (BAZETT): 436 MS
EKG R AXIS: -39 DEGREES
EKG R AXIS: 254 DEGREES
EKG T AXIS: 66 DEGREES
EKG T AXIS: 66 DEGREES
EKG VENTRICULAR RATE: 66 BPM
EKG VENTRICULAR RATE: 68 BPM

## 2024-03-22 PROCEDURE — 6370000000 HC RX 637 (ALT 250 FOR IP): Performed by: EMERGENCY MEDICINE

## 2024-03-22 PROCEDURE — 96365 THER/PROPH/DIAG IV INF INIT: CPT

## 2024-03-22 PROCEDURE — 96375 TX/PRO/DX INJ NEW DRUG ADDON: CPT

## 2024-03-22 PROCEDURE — 2580000003 HC RX 258: Performed by: EMERGENCY MEDICINE

## 2024-03-22 PROCEDURE — 6360000002 HC RX W HCPCS: Performed by: EMERGENCY MEDICINE

## 2024-03-22 RX ORDER — CEFUROXIME AXETIL 250 MG/1
250 TABLET ORAL 2 TIMES DAILY
Qty: 14 TABLET | Refills: 0 | Status: ON HOLD | OUTPATIENT
Start: 2024-03-22 | End: 2024-03-29 | Stop reason: HOSPADM

## 2024-03-22 RX ORDER — FLUCONAZOLE 100 MG/1
200 TABLET ORAL
Status: COMPLETED | OUTPATIENT
Start: 2024-03-22 | End: 2024-03-22

## 2024-03-22 RX ORDER — FLUCONAZOLE 100 MG/1
200 TABLET ORAL DAILY
Qty: 14 TABLET | Refills: 0 | Status: ON HOLD | OUTPATIENT
Start: 2024-03-22 | End: 2024-03-29 | Stop reason: HOSPADM

## 2024-03-22 RX ADMIN — FLUCONAZOLE 200 MG: 100 TABLET ORAL at 01:36

## 2024-03-22 RX ADMIN — CEFTRIAXONE 1000 MG: 1 INJECTION, POWDER, FOR SOLUTION INTRAMUSCULAR; INTRAVENOUS at 01:36

## 2024-03-22 RX ADMIN — MORPHINE SULFATE 2 MG: 2 INJECTION, SOLUTION INTRAMUSCULAR; INTRAVENOUS at 01:42

## 2024-03-22 ASSESSMENT — PAIN DESCRIPTION - LOCATION
LOCATION: LEG
LOCATION: LEG

## 2024-03-22 ASSESSMENT — PAIN SCALES - GENERAL
PAINLEVEL_OUTOF10: 7
PAINLEVEL_OUTOF10: 7

## 2024-03-22 ASSESSMENT — PAIN DESCRIPTION - DESCRIPTORS
DESCRIPTORS: ACHING
DESCRIPTORS: ACHING

## 2024-03-22 ASSESSMENT — PAIN - FUNCTIONAL ASSESSMENT
PAIN_FUNCTIONAL_ASSESSMENT: NONE - DENIES PAIN
PAIN_FUNCTIONAL_ASSESSMENT: 0-10

## 2024-03-22 NOTE — ED NOTES
Attempted to straight cath patient for urine. Did not get any return. Diaper was wet when taken off.

## 2024-03-23 LAB
EKG ATRIAL RATE: 65 BPM
EKG DIAGNOSIS: NORMAL
EKG P AXIS: 35 DEGREES
EKG P-R INTERVAL: 188 MS
EKG Q-T INTERVAL: 422 MS
EKG QRS DURATION: 82 MS
EKG QTC CALCULATION (BAZETT): 438 MS
EKG R AXIS: 52 DEGREES
EKG T AXIS: 42 DEGREES
EKG VENTRICULAR RATE: 65 BPM

## 2024-03-24 ENCOUNTER — APPOINTMENT (OUTPATIENT)
Facility: HOSPITAL | Age: 64
DRG: 871 | End: 2024-03-24
Payer: MEDICARE

## 2024-03-24 ENCOUNTER — HOSPITAL ENCOUNTER (INPATIENT)
Facility: HOSPITAL | Age: 64
LOS: 5 days | Discharge: HOSPICE | DRG: 871 | End: 2024-03-29
Attending: INTERNAL MEDICINE | Admitting: INTERNAL MEDICINE
Payer: MEDICARE

## 2024-03-24 DIAGNOSIS — N18.6 ESRD ON HEMODIALYSIS (HCC): ICD-10-CM

## 2024-03-24 DIAGNOSIS — Z99.2 ESRD ON HEMODIALYSIS (HCC): ICD-10-CM

## 2024-03-24 DIAGNOSIS — N30.01 ACUTE CYSTITIS WITH HEMATURIA: Primary | ICD-10-CM

## 2024-03-24 DIAGNOSIS — R53.1 GENERAL WEAKNESS: ICD-10-CM

## 2024-03-24 DIAGNOSIS — I69.30 HISTORY OF HEMORRHAGIC CEREBROVASCULAR ACCIDENT (CVA) WITH RESIDUAL DEFICIT: ICD-10-CM

## 2024-03-24 PROBLEM — R65.10 SIRS (SYSTEMIC INFLAMMATORY RESPONSE SYNDROME) (HCC): Status: ACTIVE | Noted: 2024-03-24

## 2024-03-24 LAB
ALBUMIN SERPL-MCNC: 2.2 G/DL (ref 3.4–5)
ALBUMIN/GLOB SERPL: 0.5 (ref 0.8–1.7)
ALP SERPL-CCNC: 196 U/L (ref 45–117)
ALT SERPL-CCNC: 12 U/L (ref 13–56)
ANION GAP SERPL CALC-SCNC: 14 MMOL/L (ref 3–18)
APPEARANCE UR: ABNORMAL
AST SERPL-CCNC: 23 U/L (ref 10–38)
BACTERIA SPEC CULT: ABNORMAL
BACTERIA URNS QL MICRO: ABNORMAL /HPF
BASOPHILS # BLD: 0 K/UL (ref 0–0.1)
BASOPHILS NFR BLD: 0 % (ref 0–2)
BILIRUB SERPL-MCNC: 0.4 MG/DL (ref 0.2–1)
BILIRUB UR QL: ABNORMAL
BUN SERPL-MCNC: 54 MG/DL (ref 7–18)
BUN/CREAT SERPL: 16 (ref 12–20)
CALCIUM SERPL-MCNC: 9.2 MG/DL (ref 8.5–10.1)
CC UR VC: ABNORMAL
CHLORIDE SERPL-SCNC: 101 MMOL/L (ref 100–111)
CK SERPL-CCNC: 209 U/L (ref 26–192)
CO2 SERPL-SCNC: 23 MMOL/L (ref 21–32)
COLOR UR: ABNORMAL
CREAT SERPL-MCNC: 3.29 MG/DL (ref 0.6–1.3)
DIFFERENTIAL METHOD BLD: ABNORMAL
EOSINOPHIL # BLD: 0 K/UL (ref 0–0.4)
EOSINOPHIL NFR BLD: 0 % (ref 0–5)
EPITH CASTS URNS QL MICRO: ABNORMAL /LPF (ref 0–5)
ERYTHROCYTE [DISTWIDTH] IN BLOOD BY AUTOMATED COUNT: 15.2 % (ref 11.6–14.5)
GLOBULIN SER CALC-MCNC: 4.2 G/DL (ref 2–4)
GLUCOSE BLD STRIP.AUTO-MCNC: 134 MG/DL (ref 70–110)
GLUCOSE SERPL-MCNC: 143 MG/DL (ref 74–99)
GLUCOSE UR STRIP.AUTO-MCNC: 100 MG/DL
HCT VFR BLD AUTO: 38.7 % (ref 35–45)
HGB BLD-MCNC: 11.8 G/DL (ref 12–16)
HGB UR QL STRIP: ABNORMAL
IMM GRANULOCYTES # BLD AUTO: 0.1 K/UL (ref 0–0.04)
IMM GRANULOCYTES NFR BLD AUTO: 1 % (ref 0–0.5)
KETONES UR QL STRIP.AUTO: 15 MG/DL
LEUKOCYTE ESTERASE UR QL STRIP.AUTO: ABNORMAL
LYMPHOCYTES # BLD: 1.2 K/UL (ref 0.9–3.6)
LYMPHOCYTES NFR BLD: 8 % (ref 21–52)
MCH RBC QN AUTO: 30.6 PG (ref 24–34)
MCHC RBC AUTO-ENTMCNC: 30.5 G/DL (ref 31–37)
MCV RBC AUTO: 100.5 FL (ref 78–100)
MONOCYTES # BLD: 1 K/UL (ref 0.05–1.2)
MONOCYTES NFR BLD: 7 % (ref 3–10)
NEUTS SEG # BLD: 12.5 K/UL (ref 1.8–8)
NEUTS SEG NFR BLD: 84 % (ref 40–73)
NITRITE UR QL STRIP.AUTO: NEGATIVE
NRBC # BLD: 0 K/UL (ref 0–0.01)
NRBC BLD-RTO: 0 PER 100 WBC
OTHER: ABNORMAL
PH UR STRIP: 5.5 (ref 5–8)
PLATELET # BLD AUTO: 202 K/UL (ref 135–420)
PMV BLD AUTO: 12.6 FL (ref 9.2–11.8)
POTASSIUM SERPL-SCNC: 4.4 MMOL/L (ref 3.5–5.5)
PROT SERPL-MCNC: 6.4 G/DL (ref 6.4–8.2)
PROT UR STRIP-MCNC: >300 MG/DL
RBC # BLD AUTO: 3.85 M/UL (ref 4.2–5.3)
RBC #/AREA URNS HPF: ABNORMAL /HPF (ref 0–5)
SERVICE CMNT-IMP: ABNORMAL
SODIUM SERPL-SCNC: 138 MMOL/L (ref 136–145)
SP GR UR REFRACTOMETRY: >1.03 (ref 1–1.03)
TROPONIN I SERPL HS-MCNC: 41 NG/L (ref 0–54)
UROBILINOGEN UR QL STRIP.AUTO: 0.2 EU/DL (ref 0.2–1)
WBC # BLD AUTO: 14.9 K/UL (ref 4.6–13.2)
WBC URNS QL MICRO: ABNORMAL /HPF (ref 0–5)

## 2024-03-24 PROCEDURE — 71045 X-RAY EXAM CHEST 1 VIEW: CPT

## 2024-03-24 PROCEDURE — 83036 HEMOGLOBIN GLYCOSYLATED A1C: CPT

## 2024-03-24 PROCEDURE — 87086 URINE CULTURE/COLONY COUNT: CPT

## 2024-03-24 PROCEDURE — 87040 BLOOD CULTURE FOR BACTERIA: CPT

## 2024-03-24 PROCEDURE — 2580000003 HC RX 258

## 2024-03-24 PROCEDURE — 99285 EMERGENCY DEPT VISIT HI MDM: CPT

## 2024-03-24 PROCEDURE — 84484 ASSAY OF TROPONIN QUANT: CPT

## 2024-03-24 PROCEDURE — 6360000002 HC RX W HCPCS

## 2024-03-24 PROCEDURE — 84145 PROCALCITONIN (PCT): CPT

## 2024-03-24 PROCEDURE — 85025 COMPLETE CBC W/AUTO DIFF WBC: CPT

## 2024-03-24 PROCEDURE — 82550 ASSAY OF CK (CPK): CPT

## 2024-03-24 PROCEDURE — 82962 GLUCOSE BLOOD TEST: CPT

## 2024-03-24 PROCEDURE — 6360000002 HC RX W HCPCS: Performed by: PHYSICIAN ASSISTANT

## 2024-03-24 PROCEDURE — 1100000003 HC PRIVATE W/ TELEMETRY

## 2024-03-24 PROCEDURE — 80053 COMPREHEN METABOLIC PANEL: CPT

## 2024-03-24 PROCEDURE — 2580000003 HC RX 258: Performed by: PHYSICIAN ASSISTANT

## 2024-03-24 PROCEDURE — 81001 URINALYSIS AUTO W/SCOPE: CPT

## 2024-03-24 RX ORDER — INSULIN LISPRO 100 [IU]/ML
0-6 INJECTION, SOLUTION INTRAVENOUS; SUBCUTANEOUS
Status: ON HOLD | COMMUNITY
Start: 2023-11-17 | End: 2024-03-29 | Stop reason: HOSPADM

## 2024-03-24 RX ORDER — FOLIC ACID 1 MG/1
1 TABLET ORAL NIGHTLY
Status: ON HOLD | COMMUNITY
End: 2024-03-29 | Stop reason: HOSPADM

## 2024-03-24 RX ORDER — DEXTROSE MONOHYDRATE 100 MG/ML
INJECTION, SOLUTION INTRAVENOUS CONTINUOUS PRN
Status: DISCONTINUED | OUTPATIENT
Start: 2024-03-24 | End: 2024-03-29

## 2024-03-24 RX ORDER — ISOSORBIDE MONONITRATE 60 MG/1
30 TABLET, EXTENDED RELEASE ORAL DAILY
COMMUNITY
Start: 2023-11-28

## 2024-03-24 RX ORDER — LOSARTAN POTASSIUM 50 MG/1
100 TABLET ORAL DAILY
Status: ON HOLD | COMMUNITY
Start: 2023-11-28 | End: 2024-03-29 | Stop reason: HOSPADM

## 2024-03-24 RX ORDER — INSULIN LISPRO 100 [IU]/ML
0-8 INJECTION, SOLUTION INTRAVENOUS; SUBCUTANEOUS
Status: DISCONTINUED | OUTPATIENT
Start: 2024-03-25 | End: 2024-03-29

## 2024-03-24 RX ORDER — PREGABALIN 50 MG/1
50 CAPSULE ORAL 2 TIMES DAILY
Status: ON HOLD | COMMUNITY
End: 2024-03-29 | Stop reason: HOSPADM

## 2024-03-24 RX ORDER — ATORVASTATIN CALCIUM 10 MG/1
10 TABLET, FILM COATED ORAL NIGHTLY
Status: ON HOLD | COMMUNITY
End: 2024-03-29 | Stop reason: HOSPADM

## 2024-03-24 RX ORDER — INSULIN GLARGINE 100 [IU]/ML
12 INJECTION, SOLUTION SUBCUTANEOUS DAILY
Status: ON HOLD | COMMUNITY
Start: 2023-11-22 | End: 2024-03-29 | Stop reason: HOSPADM

## 2024-03-24 RX ORDER — MIRTAZAPINE 15 MG/1
7.5 TABLET, FILM COATED ORAL NIGHTLY
COMMUNITY

## 2024-03-24 RX ORDER — LIDOCAINE 4 G/G
1 PATCH TOPICAL EVERY 24 HOURS
COMMUNITY
Start: 2023-08-18

## 2024-03-24 RX ORDER — HYDRALAZINE HYDROCHLORIDE 25 MG/1
37.5 TABLET, FILM COATED ORAL 3 TIMES DAILY
Status: ON HOLD | COMMUNITY
Start: 2024-01-12 | End: 2024-03-29 | Stop reason: HOSPADM

## 2024-03-24 RX ORDER — ACETAMINOPHEN 325 MG/1
650 TABLET ORAL EVERY 6 HOURS PRN
COMMUNITY

## 2024-03-24 RX ORDER — CARVEDILOL 25 MG/1
25 TABLET ORAL 2 TIMES DAILY WITH MEALS
Status: ON HOLD | COMMUNITY
Start: 2024-01-12 | End: 2024-03-29 | Stop reason: HOSPADM

## 2024-03-24 RX ORDER — BUMETANIDE 2 MG/1
2 TABLET ORAL DAILY
Status: ON HOLD | COMMUNITY
Start: 2023-12-29 | End: 2024-03-29 | Stop reason: HOSPADM

## 2024-03-24 RX ORDER — INSULIN LISPRO 100 [IU]/ML
0-4 INJECTION, SOLUTION INTRAVENOUS; SUBCUTANEOUS NIGHTLY
Status: DISCONTINUED | OUTPATIENT
Start: 2024-03-24 | End: 2024-03-29

## 2024-03-24 RX ORDER — OXYCODONE HYDROCHLORIDE 5 MG/1
5 TABLET ORAL EVERY 4 HOURS PRN
Status: ON HOLD | COMMUNITY
Start: 2023-09-25 | End: 2024-03-29 | Stop reason: HOSPADM

## 2024-03-24 RX ORDER — GLUCAGON 1 MG/ML
1 KIT INJECTION PRN
Status: DISCONTINUED | OUTPATIENT
Start: 2024-03-24 | End: 2024-03-29

## 2024-03-24 RX ORDER — OMEPRAZOLE 20 MG/1
20 CAPSULE, DELAYED RELEASE ORAL DAILY
COMMUNITY
Start: 2023-05-31

## 2024-03-24 RX ORDER — CALCIUM ACETATE 667 MG/1
667 TABLET ORAL
Status: ON HOLD | COMMUNITY
End: 2024-03-29 | Stop reason: HOSPADM

## 2024-03-24 RX ORDER — ERGOCALCIFEROL 1.25 MG/1
50000 CAPSULE ORAL WEEKLY
Status: ON HOLD | COMMUNITY
Start: 2023-08-18 | End: 2024-03-29 | Stop reason: HOSPADM

## 2024-03-24 RX ADMIN — DAPTOMYCIN 210 MG: 500 INJECTION, POWDER, LYOPHILIZED, FOR SOLUTION INTRAVENOUS at 22:30

## 2024-03-24 NOTE — ED TRIAGE NOTES
Pt arrives to ed reporting weakness, pt is a resident of [facility].

## 2024-03-24 NOTE — ED PROVIDER NOTES
[facility] EMERGENCY DEPT  EMERGENCY DEPARTMENT ENCOUNTER       Pt Name: Rebeka Villegas  MRN: [id number]  Birthdate 1960  Date of evaluation: 3/24/2024  PCP: Lester Minor MD  Note Started: 8:25 PM 3/24/24     CHIEF COMPLAINT       Chief Complaint   Patient presents with   • Fatigue        HISTORY OF PRESENT ILLNESS: 1 or more elements      History From: Patient  HPI Limitations: None  Chronic Conditions: Hypertension, hyperlipidemia, ESRD on hemodialysis, intracranial bleed in November 2023  Social Determinants affecting Dx or Tx: none    Rebeka Villegas is a 63 y.o. female who presents to ED c/o fatigue, weakness and abnormal urine culture from ED visit 3 days ago. Records review reveals that patient was seen at [facility] on 3/5/2024, 3/12/2024 as well as here on 3/19/2024 and 3/21/2024 for various complaints of chest pain, shortness of breath, fatigue.  At most recent ED visit 3 days ago, she was seen here for right arm swelling, status post TDC placed on the right side, right upper extremity duplex was negative for DVT and she had a CT abdomen pelvis that showed no acute process.  She was diagnosed with UTI and urinary retention with over 700 cc of urine in her bladder, Medina was placed and she was prescribed Ceftin.  However ED provider who saw her called the nursing facility was told that patient was not \"doing well\" and that her urine culture grew greater than 100,000 colonies of vancomycin-resistant Enterococcus faecium, prompting them to return to the ER.     Nursing Notes were all reviewed and agreed with or any disagreements were addressed in the HPI.    PAST HISTORY     Past Medical History:  Past Medical History:   Diagnosis Date   • Arthropathy     left knee   • Asthma     seasonal   • Atrial fibrillation (HCC)    • CAD (coronary artery disease)    • Cerebral artery occlusion with cerebral infarction (HCC)    • Chronic kidney disease    • Diabetes mellitus (HCC)     type 2- IDDM   • Hemiplegia

## 2024-03-25 ENCOUNTER — APPOINTMENT (OUTPATIENT)
Facility: HOSPITAL | Age: 64
DRG: 871 | End: 2024-03-25
Payer: MEDICARE

## 2024-03-25 PROBLEM — N18.6 ESRD ON HEMODIALYSIS (HCC): Status: ACTIVE | Noted: 2024-03-25

## 2024-03-25 PROBLEM — I95.9 HYPOTENSION: Status: ACTIVE | Noted: 2024-03-25

## 2024-03-25 PROBLEM — R41.82 ALTERED MENTAL STATUS: Status: ACTIVE | Noted: 2024-03-25

## 2024-03-25 PROBLEM — I61.9 ICH (INTRACEREBRAL HEMORRHAGE) (HCC): Status: ACTIVE | Noted: 2023-11-13

## 2024-03-25 PROBLEM — Z99.2 ESRD ON HEMODIALYSIS (HCC): Status: ACTIVE | Noted: 2024-03-25

## 2024-03-25 PROBLEM — A41.9 SEPSIS (HCC): Status: ACTIVE | Noted: 2024-03-25

## 2024-03-25 PROBLEM — I48.91 ATRIAL FIBRILLATION (HCC): Status: ACTIVE | Noted: 2024-03-25

## 2024-03-25 LAB
ALBUMIN SERPL-MCNC: 1.8 G/DL (ref 3.4–5)
ALBUMIN/GLOB SERPL: 0.5 (ref 0.8–1.7)
ALP SERPL-CCNC: 147 U/L (ref 45–117)
ALT SERPL-CCNC: 12 U/L (ref 13–56)
AMMONIA PLAS-SCNC: 13 UMOL/L (ref 11–32)
ANION GAP SERPL CALC-SCNC: 9 MMOL/L (ref 3–18)
APTT PPP: 36.8 SEC (ref 23–36.4)
AST SERPL-CCNC: 18 U/L (ref 10–38)
BASOPHILS # BLD: 0 K/UL (ref 0–0.1)
BASOPHILS NFR BLD: 0 % (ref 0–2)
BILIRUB DIRECT SERPL-MCNC: 0.1 MG/DL (ref 0–0.2)
BILIRUB SERPL-MCNC: 0.4 MG/DL (ref 0.2–1)
BUN SERPL-MCNC: 62 MG/DL (ref 7–18)
BUN/CREAT SERPL: 16 (ref 12–20)
CA-I SERPL-SCNC: 1.19 MMOL/L (ref 1.12–1.32)
CALCIUM SERPL-MCNC: 8.5 MG/DL (ref 8.5–10.1)
CHLORIDE SERPL-SCNC: 104 MMOL/L (ref 100–111)
CK SERPL-CCNC: 154 U/L (ref 26–192)
CO2 SERPL-SCNC: 27 MMOL/L (ref 21–32)
CREAT SERPL-MCNC: 3.87 MG/DL (ref 0.6–1.3)
DIFFERENTIAL METHOD BLD: ABNORMAL
EOSINOPHIL # BLD: 0 K/UL (ref 0–0.4)
EOSINOPHIL NFR BLD: 0 % (ref 0–5)
ERYTHROCYTE [DISTWIDTH] IN BLOOD BY AUTOMATED COUNT: 14.9 % (ref 11.6–14.5)
EST. AVERAGE GLUCOSE BLD GHB EST-MCNC: 137 MG/DL
GLOBULIN SER CALC-MCNC: 3.5 G/DL (ref 2–4)
GLUCOSE BLD STRIP.AUTO-MCNC: 106 MG/DL (ref 70–110)
GLUCOSE BLD STRIP.AUTO-MCNC: 125 MG/DL (ref 70–110)
GLUCOSE BLD STRIP.AUTO-MCNC: 126 MG/DL (ref 70–110)
GLUCOSE BLD STRIP.AUTO-MCNC: 130 MG/DL (ref 70–110)
GLUCOSE BLD STRIP.AUTO-MCNC: 135 MG/DL (ref 70–110)
GLUCOSE BLD STRIP.AUTO-MCNC: 45 MG/DL (ref 70–110)
GLUCOSE BLD STRIP.AUTO-MCNC: 45 MG/DL (ref 70–110)
GLUCOSE BLD STRIP.AUTO-MCNC: 50 MG/DL (ref 70–110)
GLUCOSE BLD STRIP.AUTO-MCNC: 97 MG/DL (ref 70–110)
GLUCOSE SERPL-MCNC: 80 MG/DL (ref 74–99)
HBA1C MFR BLD: 6.4 % (ref 4.2–5.6)
HBV SURFACE AG SER QL: <0.1 INDEX
HBV SURFACE AG SER QL: NEGATIVE
HCT VFR BLD AUTO: 32.9 % (ref 35–45)
HGB BLD-MCNC: 10.3 G/DL (ref 12–16)
IMM GRANULOCYTES # BLD AUTO: 0 K/UL (ref 0–0.04)
IMM GRANULOCYTES NFR BLD AUTO: 0 % (ref 0–0.5)
INR PPP: 1.3 (ref 0.9–1.1)
LACTATE SERPL-SCNC: 0.8 MMOL/L (ref 0.4–2)
LYMPHOCYTES # BLD: 0.8 K/UL (ref 0.9–3.6)
LYMPHOCYTES NFR BLD: 7 % (ref 21–52)
MAGNESIUM SERPL-MCNC: 2 MG/DL (ref 1.6–2.6)
MCH RBC QN AUTO: 30.7 PG (ref 24–34)
MCHC RBC AUTO-ENTMCNC: 31.3 G/DL (ref 31–37)
MCV RBC AUTO: 98.2 FL (ref 78–100)
MONOCYTES # BLD: 0.8 K/UL (ref 0.05–1.2)
MONOCYTES NFR BLD: 7 % (ref 3–10)
NEUTS SEG # BLD: 8.9 K/UL (ref 1.8–8)
NEUTS SEG NFR BLD: 85 % (ref 40–73)
NRBC # BLD: 0 K/UL (ref 0–0.01)
NRBC BLD-RTO: 0 PER 100 WBC
PHOSPHATE SERPL-MCNC: 4.8 MG/DL (ref 2.5–4.9)
PLATELET # BLD AUTO: 186 K/UL (ref 135–420)
PMV BLD AUTO: 11.7 FL (ref 9.2–11.8)
POTASSIUM SERPL-SCNC: 3.9 MMOL/L (ref 3.5–5.5)
PROCALCITONIN SERPL-MCNC: 1.01 NG/ML
PROT SERPL-MCNC: 5.3 G/DL (ref 6.4–8.2)
PROTHROMBIN TIME: 16.4 SEC (ref 11.9–14.7)
RBC # BLD AUTO: 3.35 M/UL (ref 4.2–5.3)
SODIUM SERPL-SCNC: 140 MMOL/L (ref 136–145)
TROPONIN I SERPL HS-MCNC: 60 NG/L (ref 0–54)
WBC # BLD AUTO: 10.5 K/UL (ref 4.6–13.2)

## 2024-03-25 PROCEDURE — 6370000000 HC RX 637 (ALT 250 FOR IP): Performed by: INTERNAL MEDICINE

## 2024-03-25 PROCEDURE — 2580000003 HC RX 258: Performed by: INTERNAL MEDICINE

## 2024-03-25 PROCEDURE — 86706 HEP B SURFACE ANTIBODY: CPT

## 2024-03-25 PROCEDURE — 2000000000 HC ICU R&B

## 2024-03-25 PROCEDURE — 36415 COLL VENOUS BLD VENIPUNCTURE: CPT

## 2024-03-25 PROCEDURE — 87340 HEPATITIS B SURFACE AG IA: CPT

## 2024-03-25 PROCEDURE — 82140 ASSAY OF AMMONIA: CPT

## 2024-03-25 PROCEDURE — 70450 CT HEAD/BRAIN W/O DYE: CPT

## 2024-03-25 PROCEDURE — 82962 GLUCOSE BLOOD TEST: CPT

## 2024-03-25 PROCEDURE — 84484 ASSAY OF TROPONIN QUANT: CPT

## 2024-03-25 PROCEDURE — 82550 ASSAY OF CK (CPK): CPT

## 2024-03-25 PROCEDURE — 83605 ASSAY OF LACTIC ACID: CPT

## 2024-03-25 PROCEDURE — 5A1D70Z PERFORMANCE OF URINARY FILTRATION, INTERMITTENT, LESS THAN 6 HOURS PER DAY: ICD-10-PCS | Performed by: INTERNAL MEDICINE

## 2024-03-25 PROCEDURE — 99223 1ST HOSP IP/OBS HIGH 75: CPT | Performed by: INTERNAL MEDICINE

## 2024-03-25 PROCEDURE — 87040 BLOOD CULTURE FOR BACTERIA: CPT

## 2024-03-25 PROCEDURE — 85610 PROTHROMBIN TIME: CPT

## 2024-03-25 PROCEDURE — 84100 ASSAY OF PHOSPHORUS: CPT

## 2024-03-25 PROCEDURE — 80048 BASIC METABOLIC PNL TOTAL CA: CPT

## 2024-03-25 PROCEDURE — 6360000002 HC RX W HCPCS: Performed by: INTERNAL MEDICINE

## 2024-03-25 PROCEDURE — 85025 COMPLETE CBC W/AUTO DIFF WBC: CPT

## 2024-03-25 PROCEDURE — 83735 ASSAY OF MAGNESIUM: CPT

## 2024-03-25 PROCEDURE — 92610 EVALUATE SWALLOWING FUNCTION: CPT

## 2024-03-25 PROCEDURE — 82330 ASSAY OF CALCIUM: CPT

## 2024-03-25 PROCEDURE — 85730 THROMBOPLASTIN TIME PARTIAL: CPT

## 2024-03-25 PROCEDURE — 80076 HEPATIC FUNCTION PANEL: CPT

## 2024-03-25 RX ORDER — ALBUMIN (HUMAN) 12.5 G/50ML
25 SOLUTION INTRAVENOUS EVERY 6 HOURS PRN
Status: DISCONTINUED | OUTPATIENT
Start: 2024-03-25 | End: 2024-03-29

## 2024-03-25 RX ORDER — HYDRALAZINE HYDROCHLORIDE 25 MG/1
37.5 TABLET, FILM COATED ORAL 3 TIMES DAILY
Status: DISCONTINUED | OUTPATIENT
Start: 2024-03-25 | End: 2024-03-27

## 2024-03-25 RX ORDER — CARVEDILOL 12.5 MG/1
25 TABLET ORAL 2 TIMES DAILY WITH MEALS
Status: DISCONTINUED | OUTPATIENT
Start: 2024-03-25 | End: 2024-03-27

## 2024-03-25 RX ORDER — ATORVASTATIN CALCIUM 10 MG/1
10 TABLET, FILM COATED ORAL NIGHTLY
Status: DISCONTINUED | OUTPATIENT
Start: 2024-03-25 | End: 2024-03-29

## 2024-03-25 RX ORDER — SODIUM CHLORIDE 9 MG/ML
INJECTION, SOLUTION INTRAVENOUS CONTINUOUS
Status: DISCONTINUED | OUTPATIENT
Start: 2024-03-25 | End: 2024-03-26

## 2024-03-25 RX ORDER — CALCIUM ACETATE 667 MG/1
667 CAPSULE ORAL
Status: DISCONTINUED | OUTPATIENT
Start: 2024-03-25 | End: 2024-03-29

## 2024-03-25 RX ORDER — PREGABALIN 50 MG/1
50 CAPSULE ORAL DAILY
Status: DISCONTINUED | OUTPATIENT
Start: 2024-03-25 | End: 2024-03-29 | Stop reason: HOSPADM

## 2024-03-25 RX ORDER — LIDOCAINE 4 G/G
1 PATCH TOPICAL EVERY 24 HOURS
Status: DISCONTINUED | OUTPATIENT
Start: 2024-03-25 | End: 2024-03-29 | Stop reason: HOSPADM

## 2024-03-25 RX ORDER — MIRTAZAPINE 15 MG/1
7.5 TABLET, FILM COATED ORAL NIGHTLY
Status: DISCONTINUED | OUTPATIENT
Start: 2024-03-25 | End: 2024-03-28

## 2024-03-25 RX ORDER — ONDANSETRON 4 MG/1
4 TABLET, ORALLY DISINTEGRATING ORAL EVERY 8 HOURS PRN
Status: DISCONTINUED | OUTPATIENT
Start: 2024-03-25 | End: 2024-03-29 | Stop reason: HOSPADM

## 2024-03-25 RX ORDER — SODIUM CHLORIDE 0.9 % (FLUSH) 0.9 %
5-40 SYRINGE (ML) INJECTION PRN
Status: DISCONTINUED | OUTPATIENT
Start: 2024-03-25 | End: 2024-03-29 | Stop reason: HOSPADM

## 2024-03-25 RX ORDER — 0.9 % SODIUM CHLORIDE 0.9 %
500 INTRAVENOUS SOLUTION INTRAVENOUS ONCE
Status: DISCONTINUED | OUTPATIENT
Start: 2024-03-25 | End: 2024-03-25

## 2024-03-25 RX ORDER — ACETAMINOPHEN 325 MG/1
650 TABLET ORAL EVERY 6 HOURS PRN
Status: DISCONTINUED | OUTPATIENT
Start: 2024-03-25 | End: 2024-03-28 | Stop reason: SDUPTHER

## 2024-03-25 RX ORDER — INSULIN LISPRO 100 [IU]/ML
0-6 INJECTION, SOLUTION INTRAVENOUS; SUBCUTANEOUS
Status: DISCONTINUED | OUTPATIENT
Start: 2024-03-25 | End: 2024-03-28

## 2024-03-25 RX ORDER — BUMETANIDE 1 MG/1
2 TABLET ORAL DAILY
Status: DISCONTINUED | OUTPATIENT
Start: 2024-03-25 | End: 2024-03-25

## 2024-03-25 RX ORDER — SODIUM CHLORIDE 0.9 % (FLUSH) 0.9 %
5-40 SYRINGE (ML) INJECTION EVERY 12 HOURS SCHEDULED
Status: DISCONTINUED | OUTPATIENT
Start: 2024-03-25 | End: 2024-03-29 | Stop reason: HOSPADM

## 2024-03-25 RX ORDER — INSULIN GLARGINE 100 [IU]/ML
12 INJECTION, SOLUTION SUBCUTANEOUS DAILY
Status: DISCONTINUED | OUTPATIENT
Start: 2024-03-25 | End: 2024-03-27

## 2024-03-25 RX ORDER — SODIUM CHLORIDE 9 MG/ML
INJECTION, SOLUTION INTRAVENOUS PRN
Status: DISCONTINUED | OUTPATIENT
Start: 2024-03-25 | End: 2024-03-29

## 2024-03-25 RX ORDER — PANTOPRAZOLE SODIUM 40 MG/1
40 TABLET, DELAYED RELEASE ORAL
Status: DISCONTINUED | OUTPATIENT
Start: 2024-03-25 | End: 2024-03-28

## 2024-03-25 RX ORDER — ACETAMINOPHEN 325 MG/1
650 TABLET ORAL EVERY 6 HOURS PRN
Status: DISCONTINUED | OUTPATIENT
Start: 2024-03-25 | End: 2024-03-29 | Stop reason: HOSPADM

## 2024-03-25 RX ORDER — FOLIC ACID 1 MG/1
1 TABLET ORAL NIGHTLY
Status: DISCONTINUED | OUTPATIENT
Start: 2024-03-25 | End: 2024-03-29

## 2024-03-25 RX ORDER — 0.9 % SODIUM CHLORIDE 0.9 %
250 INTRAVENOUS SOLUTION INTRAVENOUS ONCE
Status: COMPLETED | OUTPATIENT
Start: 2024-03-25 | End: 2024-03-25

## 2024-03-25 RX ORDER — ONDANSETRON 2 MG/ML
4 INJECTION INTRAMUSCULAR; INTRAVENOUS EVERY 6 HOURS PRN
Status: DISCONTINUED | OUTPATIENT
Start: 2024-03-25 | End: 2024-03-29 | Stop reason: HOSPADM

## 2024-03-25 RX ORDER — OXYCODONE HYDROCHLORIDE 5 MG/1
5 TABLET ORAL EVERY 4 HOURS PRN
Status: DISCONTINUED | OUTPATIENT
Start: 2024-03-25 | End: 2024-03-29

## 2024-03-25 RX ORDER — ACETAMINOPHEN 650 MG/1
650 SUPPOSITORY RECTAL EVERY 6 HOURS PRN
Status: DISCONTINUED | OUTPATIENT
Start: 2024-03-25 | End: 2024-03-29 | Stop reason: HOSPADM

## 2024-03-25 RX ORDER — POLYETHYLENE GLYCOL 3350 17 G/17G
17 POWDER, FOR SOLUTION ORAL DAILY PRN
Status: DISCONTINUED | OUTPATIENT
Start: 2024-03-25 | End: 2024-03-29

## 2024-03-25 RX ORDER — ISOSORBIDE MONONITRATE 30 MG/1
30 TABLET, EXTENDED RELEASE ORAL DAILY
Status: DISCONTINUED | OUTPATIENT
Start: 2024-03-25 | End: 2024-03-29 | Stop reason: HOSPADM

## 2024-03-25 RX ORDER — LOSARTAN POTASSIUM 50 MG/1
100 TABLET ORAL DAILY
Status: DISCONTINUED | OUTPATIENT
Start: 2024-03-25 | End: 2024-03-27

## 2024-03-25 RX ORDER — DEXTROSE MONOHYDRATE 100 MG/ML
INJECTION, SOLUTION INTRAVENOUS CONTINUOUS
Status: DISCONTINUED | OUTPATIENT
Start: 2024-03-25 | End: 2024-03-26

## 2024-03-25 RX ADMIN — SODIUM CHLORIDE 250 ML: 9 INJECTION, SOLUTION INTRAVENOUS at 14:17

## 2024-03-25 RX ADMIN — GENTAMICIN SULFATE 160 MG: 40 INJECTION, SOLUTION INTRAMUSCULAR; INTRAVENOUS at 14:18

## 2024-03-25 RX ADMIN — DEXTROSE MONOHYDRATE 125 ML: 100 INJECTION, SOLUTION INTRAVENOUS at 16:31

## 2024-03-25 RX ADMIN — PANTOPRAZOLE SODIUM 40 MG: 40 TABLET, DELAYED RELEASE ORAL at 06:21

## 2024-03-25 RX ADMIN — APIXABAN 2.5 MG: 2.5 TABLET, FILM COATED ORAL at 02:37

## 2024-03-25 RX ADMIN — OXYCODONE 5 MG: 5 TABLET ORAL at 02:40

## 2024-03-25 RX ADMIN — INSULIN GLARGINE 12 UNITS: 100 INJECTION, SOLUTION SUBCUTANEOUS at 09:14

## 2024-03-25 RX ADMIN — DEXTROSE MONOHYDRATE: 100 INJECTION, SOLUTION INTRAVENOUS at 19:33

## 2024-03-25 RX ADMIN — SODIUM CHLORIDE, PRESERVATIVE FREE 10 ML: 5 INJECTION INTRAVENOUS at 21:12

## 2024-03-25 RX ADMIN — FOLIC ACID 1 MG: 1 TABLET ORAL at 02:37

## 2024-03-25 RX ADMIN — SODIUM CHLORIDE: 9 INJECTION, SOLUTION INTRAVENOUS at 16:19

## 2024-03-25 RX ADMIN — MEROPENEM 1000 MG: 1 INJECTION, POWDER, FOR SOLUTION INTRAVENOUS at 11:56

## 2024-03-25 RX ADMIN — DEXTROSE MONOHYDRATE 125 ML: 100 INJECTION, SOLUTION INTRAVENOUS at 19:17

## 2024-03-25 RX ADMIN — SODIUM CHLORIDE, PRESERVATIVE FREE 10 ML: 5 INJECTION INTRAVENOUS at 09:17

## 2024-03-25 ASSESSMENT — PAIN SCALES - PAIN ASSESSMENT IN ADVANCED DEMENTIA (PAINAD)
CONSOLABILITY: NO NEED TO CONSOLE
TOTALSCORE: 0
TOTALSCORE: 0
FACIALEXPRESSION: SMILING OR INEXPRESSIVE
BODYLANGUAGE: RELAXED
BREATHING: NORMAL
FACIALEXPRESSION: SMILING OR INEXPRESSIVE
CONSOLABILITY: NO NEED TO CONSOLE
BODYLANGUAGE: RELAXED
TOTALSCORE: 0
TOTALSCORE: 0
BODYLANGUAGE: RELAXED
BREATHING: NORMAL
FACIALEXPRESSION: SMILING OR INEXPRESSIVE
FACIALEXPRESSION: SMILING OR INEXPRESSIVE
BREATHING: NORMAL
BREATHING: NORMAL
BODYLANGUAGE: RELAXED

## 2024-03-25 ASSESSMENT — PAIN SCALES - GENERAL
PAINLEVEL_OUTOF10: 8
PAINLEVEL_OUTOF10: 0
PAINLEVEL_OUTOF10: 0

## 2024-03-25 ASSESSMENT — PAIN DESCRIPTION - LOCATION: LOCATION: GENERALIZED

## 2024-03-25 ASSESSMENT — PAIN DESCRIPTION - DESCRIPTORS: DESCRIPTORS: ACHING

## 2024-03-25 ASSESSMENT — PAIN DESCRIPTION - ORIENTATION: ORIENTATION: RIGHT;LEFT

## 2024-03-25 NOTE — PROGRESS NOTES
Bedside and Verbal shift change report given to Mishel RN (oncoming nurse) . Report included the following information Nurse Handoff Report, Index, Intake/Output, MAR, and Recent Results.

## 2024-03-25 NOTE — ACP (ADVANCE CARE PLANNING)
disease    1010: Called Rakan Montalvo/primary MPOA at [phone]. This is a business office (law firm). Katia is one of the names of the business. Message left requesting a call back.    Called Dottie Peace (cousin/secondary MPOA) at [phone]. No answer. Message left requesting call back    1035: Dottie Peace returned call. Introduced role of palliative care to the hospitalized patient. Ms Peace is Ms Villegas's cousin. She and Mr Montalvo (primary MPOA) are in regular conversation    Lives at [facility] rehab currently. She has been home very little recently due to multiple hospitalizations. She is  and her  has advanced dementia and is currently in hospice care at [facility]. The Rosette have two sons from whom they are currently estranged.     Brief medical update given including hypotension this morning. Ms Peace said that she has had multiple conversations with the patient and staff at [facility] abouto code status and that the patient had always been clear that she would accept all resuscitation options including CPR, electric shocks, and intubation/ventilation. Explained our concern about difficulty liberating from life support should she require that. Ms Peace will speak with Mr Montalvo and follow up. FULL CODE continues until any changes are made. Hospitalist and care management team updated.     1430: patient had been transferred to the ICU for decreased responsiveness and hypotension. BP normalized. Seen with Dr Vance Esquivel in 103. Remains pale. Slightly more alert than when seen earlier this morning. Was able to tell us that her  was in hospice after a stroke.    1500--rec'd call from Rakan Montalvo, primary MPOA. Brief medical update given. Discussed code status and the discussion with LELE Sinclair. Offered video call for Dottie and him if that would be helpful and he will consider and discuss with Dottie.    Disposition plan: to be determined. Anticipate she will  return to [facility]    Palliative care will continue to follow Rebeka William  and her family/MPOA during this hospitalization and support them as they make healthcare decisions and define goals of care.    Primary Palliative Diagnosis:   Lethargy  Stroke history  hypotension    Conversation Summary: see above  Resuscitation Status: FULL CODE    Completed Documentation:    [] Advance Directive  [] Portable DNR  [] POLST  [] [facility]  [x] No new documents completed    Racheal Valles RN, MSN  Palliative Medicine  [phone]

## 2024-03-25 NOTE — ED NOTES
TRANSFER - OUT REPORT:    Verbal report given to NAMAN Springer on Rebeka Villegas  being transferred to [address] [place] [address] for routine progression of patient care       Report consisted of patient's Situation, Background, Assessment and   Recommendations(SBAR).     Information from the following report(s) Nurse Handoff Report, Index, ED Encounter Summary, MAR, and Recent Results was reviewed with the receiving nurse.    Kinder Fall Assessment:                           Lines:   Peripheral IV 03/24/24 Left Antecubital (Active)   Site Assessment Clean, dry & intact 03/24/24 1954   Line Status Blood return noted 03/24/24 1954   Line Care Cap changed 03/24/24 1954   Phlebitis Assessment No symptoms 03/24/24 1954   Infiltration Assessment 0 03/24/24 1954        Opportunity for questions and clarification was provided.      Patient transported with:  Registered Nurse

## 2024-03-25 NOTE — CARE COORDINATION
03/25/24 1011   Service Assessment   Patient Orientation Unable to Assess  (patient asleep)   History Provided By Other (see comment)  (Admissions at [facility])   Primary Caregiver   (Nik Rajan)   Support Systems Other (Comment)   PCP Verified by CM Yes   Social/Functional History   Lives With   (long term care at [facility])   Occupation Other(comment)   Discharge Planning   Type of Residence Skilled Nursing Facility   Living Arrangements Other (Comment)   Current Services Prior To Admission None   DME Ordered? Other (comment)   Potential Assistance Purchasing Medications No   Patient expects to be discharged to: Long-term care   Services At/After Discharge   Transition of Care Consult (CM Consult) Long Term Care   Services At/After Discharge SLP   [place] Resource Information Provided? No   Mode of Transport at Discharge BLS   Condition of Participation: Discharge Planning   The Plan for Transition of Care is related to the following treatment goals: \" wanting to return back to LTC\"   The Patient and/or Patient Representative was provided with a Choice of Provider? Patient   The Patient and/Or Patient Representative agree with the Discharge Plan? Yes   Freedom of Choice list was provided with basic dialogue that supports the patient's individualized plan of care/goals, treatment preferences, and shares the quality data associated with the providers?  Yes       Patient asleep and unable to assess at this time. Information being reported from [facility] @ [facility]. SW reached out and LM for family, Dottie, to return call regarding DC planning. Patient attends dialysis T TH Sat and will have dialysis while in acute setting as needed. Palliative care to consult as well regarding goals of care, per MD notes, prognosis is poor. Referral placed for the [facility] to review. Plan to return to LTC when stable. SW to follow.

## 2024-03-25 NOTE — PROGRESS NOTES
Hospitalist Progress Note    Patient: Rebeka Villegas MRN: [id number]  CSN: [id number]    YOB: 1960  Age: 63 y.o.  Sex: female    DOA: 3/24/2024 LOS:  LOS: 1 day          Chief Complaint:    sepsis      Assessment/Plan   62 yo dialysis patient sent back to ER for positive urine cx with VRE, from NH      Sepsis  AMS  ESRD  UTI  Debility and frailty  Hypotension-hold hydralazine, coreg, cozaar, bumex    Monitor hemodynamics  Albumin PRN lower BP    Hold BP meds and diuretic    Transfer to ICU fro closer monitoring    Blood cx pending  Look for other source of sepsis  Broad spectrum IV abx for now per ID    CT head unrevealing    Albumin PRN low BP    VRE UTI  Now on daptomycin  Infectious diseases consult     End-stage renal disease on dialysis Tuesday Thursday Saturday -consult nephrology     Atrial fibrillation rate controlled  recent hemorrhagic stroke and recent PE  Continue Eliquis twice daily     Pulmonary edema   Pending dialysis on Tuesday  No hypoxia or Sob seen  Telemetry monitoring  Echocardiogram  Restrict fluids     Recent hemorrhage neurologic stroke with hemiplegia on right  Aspiration precatuion   Speech eval      DM-hold lantus as low intake and AMS  Sliding scale insulin      Sacral decub  Wound care consult     Poor prognosis   pallative care consulted and MPOA contacted  Full code per paper work    Nursing home patient  Disposition :  Active Hospital Problems    Diagnosis     Sepsis ([facility]) [A41.9]     Hypotension [I95.9]     ESRD on hemodialysis ([facility]) [N18.6, Z99.2]     Altered mental status [R41.82]     Atrial fibrillation ([facility]) [I48.91]     SIRS (systemic inflammatory response syndrome) ([facility]) [R65.10]     ICH (intracerebral hemorrhage) ([facility]) [I61.9]     Pulmonary edema [J81.1]     GERD (gastroesophageal reflux disease) [K21.9]     Anemia of chronic disease [D63.8]     Uncontrolled type 2 diabetes mellitus with hyperglycemia ([facility]) [E11.65]     Chronic diastolic CHF (congestive heart

## 2024-03-25 NOTE — PROGRESS NOTES
SPEECH LANGUAGE PATHOLOGY BEDSIDE SWALLOW EVALUATION  Recommendations:   Puree diet with liquids  Meds crushed in puree as able  Total feeding assistance  Aspiration precautions  HOB >45 degrees during all intake and for at least 30 min after intake  Small bites/sips, Feed slowly, alternating bites/sips   Oral care three times daily   Dysphagia treatment    Goals:  Patient will:  1. Tolerate PO trials with 0 s/sx overt distress in 4/5 trials in order to determine least restrictive diet  2. Utilize compensatory swallow strategies/maneuvers (decrease bite/sip, size/rate, alt. liq/sol) with min cues in 4/5 trials in order to increase safety and tolerance of PO intake.      Patient: Rebeka Villegas (63 y.o. female)  Date: 3/25/2024  Primary Diagnosis: General weakness [R53.1]  SIRS (systemic inflammatory response syndrome) ([facility]) [R65.10]  ESRD on hemodialysis (HCC) [N18.6, Z99.2]  Acute cystitis with hematuria [N30.01]  History of hemorrhagic cerebrovascular accident (CVA) with residual deficit [I69.30]       Precautions: Aspiration  PLOF: As per H&P  Evaluation Time: 1100  DYSPHAGIA ASSESSMENT:  Based on the objective data described below, the patient presents with moderate oral dysphagia. Patient seen today for clinical bedside swallow evaluation. She is alert but somewhat lethargic, requiring mod cues to follow instructions. She has hx of CVA with residual R side weakness. Does not speak much and inconsistent shakes/nods head to answer y/n questions. Noted dried secretions in oral cavity, so ST performed dependent oral care, which patient tolerated well. Lingual coordination is somewhat impaired and patient is missing several teeth. Dx trials include thin liquid +/- straw, puree, and soft and bite size solid. Patient tolerated all trials without overt s/sx of aspiration. Mastication/bolus formation was slow and inefficient, but patient able to clear puree residue with liquid wash. She was unable to adequately prepare

## 2024-03-25 NOTE — CONSULTS
Palliative Medicine Consult  [facility]: [phone])  [facility]: [phone]     Patient Name: Rebeka Villegas  YOB: 1960    Date of Initial Consult: 3/25/24  Reason for Consult: goals of care discussion/ACP/symptoms management  Requesting Provider:  Jevon Taveras MD   Primary Care Physician: Lester Minor MD      SUMMARY:     Rebeka Villegas is a 63 y.o. with a past history of hemorrhagic stroke with right-sided weakness, history of recent PE, atrial fibrillation, ESRD on hemodialysis, coronary artery disease, diabetes mellitus, hypertension and history of sepsis, who was admitted on 3/24/2024 from nursing home with a diagnosis of VRE UTI.  Patient initially presented to emergency room at the request for further treatment of VRE UTI that was diagnosed on previous ED visit.  Patient was started on daptomycin and infectious disease was consulted.  Nephrology was consulted for resumption of hemodialysis.  Patient has history of recent PE and was cleared to use Eliquis.  CT head was negative for any intracranial hemorrhage.  Patient subsequently developed hypotension and required ICU transfer.  Current medical issues leading to Palliative Medicine involvement include: To establish goals of care.    3/25/24: Patient was seen in presence of palliative care RN.  Patient opens eyes and follows some verbal commands.  Denies for having any pain.  Feeling tired.  No nausea or vomiting.  Mild right-sided upper extremity pain/discomfort present     HISTORY:     History obtained from: Medical records    CHIEF COMPLAINT: Was asked to come to the emergency room to be admitted for treatment of VRE UTI    HPI/SUBJECTIVE:    The patient is:   [x] Verbal and participatory: Limited and poor historian  [] Non-participatory due to:         PHYSICAL EXAM:     From RN flowsheet:  Wt Readings from Last 3 Encounters:   03/21/24 52.6 kg (116 lb)   03/19/24 53 kg (116 lb 13.5 oz)    Ammonia    Collection Time: 03/25/24  1:39 PM   Result Value Ref Range    Ammonia 13 11 - 32 UMOL/L   Protime-INR    Collection Time: 03/25/24  1:39 PM   Result Value Ref Range    Protime 16.4 (H) 11.9 - 14.7 sec    INR 1.3 (H) 0.9 - 1.1     APTT    Collection Time: 03/25/24  1:39 PM   Result Value Ref Range    APTT 36.8 (H) 23.0 - 36.4 SEC             Total time: 75 minutes    Disclaimer: Sections of this note are dictated using utilizing voice recognition software, which may have resulted in some phonetic based errors in grammar and contents. Even though attempts were made to correct all the mistakes, some may have been missed, and remained in the body of the document. If questions arise, please contact our department.

## 2024-03-25 NOTE — CONSULTS
03/25/24 0237    acetaminophen (TYLENOL) tablet 650 mg, 650 mg, Oral, Q6H PRN, Jevon Taveras MD    [Held by provider] atorvastatin (LIPITOR) tablet 10 mg, 10 mg, Oral, Nightly, Jevon Taveras MD    calcium acetate (PHOSLO) capsule 667 mg, 667 mg, Oral, TID , Jevon Taveras MD    [Held by provider] carvedilol (COREG) tablet 25 mg, 25 mg, Oral, BID , Jevon Taveras MD    folic acid (FOLVITE) tablet 1 mg, 1 mg, Oral, Nightly, Jevon Taveras MD, 1 mg at 03/25/24 0237    [Held by provider] hydrALAZINE (APRESOLINE) tablet 37.5 mg, 37.5 mg, Oral, TID, Jevon Taveras MD    [Held by provider] insulin glargine (LANTUS) injection vial 12 Units, 12 Units, SubCUTAneous, Daily, Jevon Taveras MD, 12 Units at 03/25/24 0914    insulin lispro (HUMALOG) injection vial 0-6 Units, 0-6 Units, SubCUTAneous, TID , Jevon Taveras MD    isosorbide mononitrate (IMDUR) extended release tablet 30 mg, 30 mg, Oral, Daily, Jevon Taveras MD    lidocaine 4 % external patch 1 patch, 1 patch, Topical, Q24H, Jevon Taveras MD, 1 patch at 03/25/24 0237    [Held by provider] losartan (COZAAR) tablet 100 mg, 100 mg, Oral, Daily, Jevon Taveras MD    mirtazapine (REMERON) tablet 7.5 mg, 7.5 mg, Oral, Nightly, Jevon Taveras MD    pantoprazole (PROTONIX) tablet 40 mg, 40 mg, Oral, QAM , Jevon Taveras MD, 40 mg at 03/25/24 0621    oxyCODONE (ROXICODONE) immediate release tablet 5 mg, 5 mg, Oral, Q4H PRN, Jevon Taveras MD, 5 mg at 03/25/24 0240    pregabalin (LYRICA) capsule 50 mg, 50 mg, Oral, Daily, Jevon Taveras MD    meropenem (MERREM) 1,000 mg in sodium chloride 0.9 % 100 mL IVPB (mini-bag), 1,000 mg, IntraVENous, Q24H, Vinicius Valentine MD, Stopped at 03/25/24 1315    albumin human 25% IV solution 25 g, 25 g, IntraVENous, Q6H PRN, Carlota Hale MD    0.9 % sodium chloride infusion, , IntraVENous, Continuous, Malaisamy,  Year: No       Allergies:     Allergies   Allergen Reactions    Latex Anaphylaxis and Hives     itching    Pt reports latex jean baptiste is fine    Ceftriaxone Other (See Comments)     Has tolerated in 2022, and 2024    Fish Allergy Shortness Of Breath    Hydrochlorothiazide Other (See Comments) and Hives    Penicillins Rash and Itching     Tolerates ceftriaxone 07/2022    Shellfish-Derived Products Shortness Of Breath    Cheese     Iodine     Potato     Sulfa Antibiotics     Tomato        Review of Systems:   Review of System is negative except per HPI    Physical Assessment:     Vitals:    03/25/24 1700   BP: 111/65   Pulse: 60   Resp: 11   Temp:    SpO2:        Intake/Output Summary (Last 24 hours) at 3/25/2024 1725  Last data filed at 3/25/2024 1619  Gross per 24 hour   Intake 600 ml   Output 30 ml   Net 570 ml       General Appearance: lethargic, not in pain  HEENT: no jaundice, moist oral mucosa  Chest: LS clear to auscultation bilaterally  Heart: S1/S2, no murmur, RRR  Abdomen: soft, nontender, BS active   : no flank tenderness, + jean baptiste catheter  Skin: intact, no rash  Extremity: no edema  Neuro: Lethargic  HD Access: Rt IJ TDC      Be Gordon MD  [facility]  Work Number: [id number]

## 2024-03-25 NOTE — H&P
History & Physical    Patient: Rebeka Villegas MRN: [id number]  CSN: [id number]    YOB: 1960  Age: 63 y.o.  Sex: female      DOA: 3/24/2024    Chief Complaint:   Chief Complaint   Patient presents with    Fatigue       Active Hospital Problems    Diagnosis Date Noted    SIRS (systemic inflammatory response syndrome) ([facility]) [R65.10] 03/24/2024    Pulmonary edema [J81.1] 08/23/2023    GERD (gastroesophageal reflux disease) [K21.9] 06/25/2023    Anemia of chronic disease [D63.8] 05/25/2023    Uncontrolled type 2 diabetes mellitus with hyperglycemia (HCC) [E11.65] 04/02/2022    Chronic diastolic heart failure ([facility]) [I50.32] 03/31/2022    Primary hypertension [I10] 03/17/2015          HPI:     Rebeka Villegas is a 63 y.o.  female who has history of recent hemorrhagic strokeWith chronic hemiplegia, atrial fibrillation, recent PE, cleared to start Eliquis, end-stage renal disease dialysis Tuesday Thursday Saturday presents to the emergency room as a callback due to urine analysis growing VRE  Patient initially came to the emergency room 3 days ago with complaints of chest pain that started during dialysis as well as dyspnea and urinary retention at that time a straight cath urine was done for 700 cc of urine and it was sent for culture she was pancultured and then sent back to her nursing home today she returned with leukocytosis and urine culture that is positive for VRE  Infectious disease was consulted and they recommended daptomycin IV every 48 hours    Past Medical History:   Diagnosis Date    Arthropathy     left knee    Asthma     seasonal    Atrial fibrillation (HCC)     CAD (coronary artery disease)     Cerebral artery occlusion with cerebral infarction (HCC)     Chronic kidney disease     Diabetes mellitus (HCC)     type 2- IDDM    Hemiplegia affecting dominant side, post-stroke (HCC)     Hypertension     Ketoacidosis     Kidney stone     Pyelonephritis     Sepsis (HCC)     SVT (supraventricular

## 2024-03-25 NOTE — CONSULTS
seen.   No evidence of acute cardiopulmonary process.     CT ABDOMEN PELVIS WO CONTRAST Additional Contrast? None  Result Date: 3/21/2024  EXAM: CT ABDOMEN PELVIS WO CONTRAST CLINICAL HISTORY: urinary retention INDICATION: urinary retention COMPARISON: 23 2421 CONTRAST:  None. TECHNIQUE: Thin axial images were obtained through the abdomen and pelvis. Coronal and sagittal reformats were generated. Oral contrast was not administered. CT dose reduction was achieved through use of a standardized protocol tailored for this examination and automatic exposure control for dose modulation. The absence of intravenous contrast material reduces the sensitivity for evaluation of visceral organs and vasculature including presence of small mass lesions, hemodynamically significant stenoses, dissections, mucosal abnormalities etc. FINDINGS: LOWER THORAX: Left basilar atelectasis and effusion. Moderate hiatal hernia LIVER/GALLBLADDER: No mass.  CBD is not dilated. SPLEEN/PANCREAS:  within normal limits. ADRENALS/KIDNEYS: Unremarkable. Renal cortical atrophy bilaterally. STOMACH: Unremarkable. SMALL BOWEL/COLON: Fecal stasis is moderate to severe. Colonic diverticulosis is extensive. PERITONEUM: No ascites or pneumoperitoneum. RETROPERITONEUM: No lymphadenopathy or aortic aneurysm. APPENDIX: Unremarkable. BLADDER/REPRODUCTIVE ORGANS: No acute process. BONES: Left hip arthroplasty. ADDITIONAL COMMENTS: N/A   No acute intraperitoneal process is identified. Minimal left basilar atelectasis/effusion. Examination limited secondary to artifact/absence of IV contrast. Incidental and/or nonemergent findings are as described in detail above.       LE Doppler 3/5/2024-  [place] Impression:   Technically difficult study due to altered mental state, body habitus, and   sub-optimal postioning. Right popliteal  vein not visualized.   1. No evidence of DVT in the right lower extremity veins visualized.   2. No evidence of DVT in the left common  femoral vein.      ECHO December 2023-  [place] Echo: Limited with Contrast Protocol  Narrative    Left Ventricle: Left ventricle size is normal. There is increased wall  thickness with normal left ventricle mass index consistent with concentric  remodeling. Normal wall motion. The ejection fraction by visual  approximation is 50 - 55%. There is low normal systolic function. Abnormal diastolic function. Indeterminate left ventricular filling pressure.    Right Ventricle: Right ventricle size is normal. Low normal systolic  function.  no evidence of right heart strain    Left Atrium: Left atrium is severely dilated.    Right Atrium: Right atrium is dilated. Catheter present in the right  atrium.    Tricuspid Valve: Moderate (2+) transvalvular regurgitation. Moderate  pulmonary artery hypertension. RVSP is 53.0 mmHg.    Mitral Valve: Mild (1+) transvalvular regurgitation.    IVC/SVC: IVC diameter is greater than 21 mm and decreases greater than 50% during inspiration; therefore the estimated right atrial pressure is intermediate (~8 mmHg).    Extracardiac: Left pleural effusion.     VQ scan 12/24/2023-  [place] Impression  Abnormal left lung uptake.  Differential includes a high probability for acute pulmonary emboli.  However, based on the comparison chest x-ray, asymmetric lung disease could also give a similar appearance.           Please note: Voice-recognition software may have been used to generate this report, which may have resulted in some phonetic-based errors in grammar and contents. Even though attempts were made to correct all the mistakes, some may have been missed, and remained in the body of the document.    Magnus Dowling MD  3/25/2024

## 2024-03-25 NOTE — CONSULTS
able to:   [x] Indicates understanding       [] Needs reinforcement  [] Unsuccessful      [] Verbal Understanding  [] Demonstrated understanding       [] No evidence of learning  [] Refused teaching         [] N/A       Electronically signed by Brandie Bronson RN on 3/25/2024 at 12:00 PM

## 2024-03-25 NOTE — PROGRESS NOTES
I was consulted for this patient . She goes in [facility] for dialysis with [facility]   Informed Dr Gordon for her further care while in house     Donal Lux MD  [facility]

## 2024-03-25 NOTE — PROGRESS NOTES
-Notified ICU nurse at bedside that the dialysis catheter dressing to the patient's right chest was replaced.   -The patient had an undated piece of gauze with tape over her dialysis catheter site. The site was cleaned with CHG. Central line dressing was used and placed over the site.   -The dialysis catheter is not listed in the patient avatar.

## 2024-03-25 NOTE — PROGRESS NOTES
TRANSFER - IN REPORT:    Verbal report received from Marcelino CORONEL RN on Rebeka Villegas  being received from ED for routine progression of patient care      Report consisted of patient's Situation, Background, Assessment and   Recommendations(SBAR).     Information from the following report(s) Nurse Handoff Report, Index, ED Encounter Summary, ED SBAR, Adult Overview, MAR, and Recent Results was reviewed with the receiving nurse.    Opportunity for questions and clarification was provided.      2200  Care assumed upon patient's arrival to unit. Med rec completed, assessmenet done and documented in flowsheet, safety measures in place.  Med rec completed using med list from nursing home, patient in bed awake, lethargic with contractures, A/O x 4, respirations unlabored, skin dry and warm with open area to sacrum (pressure ulcer), external hemorrhoids noted, moderate hand  noted to LUE extremity, denies any pain. PIV patent and saline locked. Other assessment findings documented in flowsheets. Side rails up x 3, bed locked and in lowest position, bed alarm on, call bell and bedside table within reach.     0140  Sent down for head CT.  0200  Arrived back to unit.

## 2024-03-25 NOTE — PROGRESS NOTES
1915 - Bedside report received from JAMA Georges. Patient not responding to any painful stimuli, lethargic, blood sugar 50, PRN D10 infusion initiated. Hospitalist paged.     1925 - Dr. Taveras notified, orders received to begin D10 continuous infusion.     1930 - Shift assessment complete. Lethargic, able to open eyes minimally, unable to tell me name, oxygen saturation beginning to drop to 80s, nasal canula placed at 3L which brought oxygen saturation back to 100%. Blood glucose rechecked, 135. Will continue to monitor.     2035 - Patient still remains lethargic but stable vital signs, Dr. Taveras notified of PO Eliquis order, orders received to hold all oral meds.     0000 - Reassessment complete. Patient able to start her name and date of birth with some garbled language in between, unable to follow commands with extremities. Resting comfortable.

## 2024-03-25 NOTE — PROGRESS NOTES
TRANSFER - OUT REPORT:    Verbal report given to Klaus on Rebeka Villegas  being transferred to ICU 3 for change in patient condition (low blood pressure, lethargy, oriented x2 self/place, poor attention, closer monitoring).    Report consisted of patient's Situation, Background, Assessment and   Recommendations(SBAR).     Information from the following report(s) Nurse Handoff Report, ED Encounter Summary, Adult Overview, Intake/Output, MAR, Recent Results, and Cardiac Rhythm NSR  was reviewed with the receiving nurse.           Lines:   Peripheral IV 03/24/24 Left Antecubital (Active)   Site Assessment Clean, dry & intact 03/25/24 0925   Line Status Flushed;Capped 03/25/24 0925   Line Care Connections checked and tightened;Ports disinfected 03/25/24 0925   Phlebitis Assessment No symptoms 03/25/24 0925   Infiltration Assessment 0 03/25/24 0925   Alcohol Cap Used Yes 03/25/24 0925   Dressing Status Clean, dry & intact 03/25/24 0925   Dressing Type Transparent 03/25/24 0925        Opportunity for questions and clarification was provided.      Patient transported with:  Monitor and Registered Nurse

## 2024-03-25 NOTE — PROGRESS NOTES
-Patient is lethargic. Glucose 126.  -Patient initially did not arouse to voice/BP at that time 79/42.   -Sternal rubbed patient. Patient opened eyes but closed during conversation/needed verbal stimulation to stay awake. Oriented to self and place. BP after interaction 95/51.  -PO medications held/patient cannot safely take.  -MD called and notified of concerns.

## 2024-03-25 NOTE — PROGRESS NOTES
Physician Progress Note      PATIENT:               BLANCA BRAR  CSN #:                  [id number]  :                       1960  ADMIT DATE:       3/24/2024 6:19 PM  DISCH DATE:  RESPONDING  PROVIDER #:        Jevon Taveras MD          QUERY TEXT:    Patient admitted with sepsis, noted to have atrial fibrillation and is   maintained on Eliquis. If possible, please document in progress notes and   discharge summary if you are evaluating and/or treating any of the following:    The medical record reflects the following:  Risk Factors: 63-year-old F, afib, anticoagulated    Clinical Indicators:  H&P- Atrial fibrillation recent hemorrhagic stroke and recent PE  Continue Eliquis twice daily    Treatment: Receiving Eliquis    Georgette Dubose, RN, BSN, CRCR  [address] [place] [id number]  [email or web address]  Options provided:  -- Secondary hypercoagulable state in a patient with atrial fibrillation  -- Other - I will add my own diagnosis  -- Disagree - Not applicable / Not valid  -- Disagree - Clinically unable to determine / Unknown  -- Refer to Clinical Documentation Reviewer    PROVIDER RESPONSE TEXT:    This patient has secondary hypercoagulable state in a patient with atrial   fibrillation.    Query created by: Georgette Dubose on 3/25/2024 1:50 PM      Electronically signed by:  Jevon Taveras MD 3/25/2024 7:42 PM

## 2024-03-25 NOTE — CONSULTS
[facility]  (A Division of [facility])      Consultation Note      Date of Admission: 3/24/2024      Date of Note: 3/25/2024      Reason for Referral: Sepsis        Current Antimicrobials:    Prior Antimicrobials:  Daptomycin IV (3/24-) #1        Assessment: Rec / Plan:   Sepsis  NOT due to VRE -- this bug generally doesn't move this fast/quickly; my Spidey-senses smell another bug, such as a GNR that would drop her BP to these levels and wipe her out.  She's had an ESBL GNR at [facility] in last couple of years (that was also SS to gentamicin).  I'd recommend PULM review patient for possible TOW to ICU for closer monitoring next few days.  I have added a single shot gentamicin 160mg IV (3mg/kg) and renal-dosed meropenem IV for now while we await more cultures to return. ->dapto #1    Add renal-dosed meropenem 1000mg IV daily + single dose of gentamicin 160mg IV    Await BCx return.  ICU TOW?    She's pretty ill.   ESRD/HD    Recent L Basal Ganglia CVA    DMT2 - great control A1c 6.4%    VRE UTI  Nicholas.  Being treated with daptomycin          Microbiology:  3/25 - BCx sent     3/24 - BCx sent      UA sent       3/21 - UA (+) VRE      Lines / Catheters: Tunneled RIJ/TDC and peripheral      HPI:  CC:  Nonverbal  Ms Villegas is a (old) 63y WF with underlying CVA hx DEC23 with L basal ganglia involvement who was recalled to ED 3/24 due to (+) VRE in Urine Cx from earlier ED visit 3/21 - and admitted, but on reviewing her chart, she has been hypotensive/non-verbal (to me) and weak overnight.  Sepsis?  Afebrile.  Has been dosed for VRE/daptomycin last night    Unknown Social Hx         Principal Problem:    SIRS (systemic inflammatory response syndrome) (HCC)  Active Problems:    Chronic diastolic heart failure (HCC)    GERD (gastroesophageal reflux disease)    Anemia of chronic disease    Primary hypertension    Pulmonary edema    Uncontrolled type 2 diabetes  [[id number]] Collected: 03/24/24 1830    Order Status: Sent Specimen: Blood Updated: 03/24/24 2157    Urine Culture [[id number]]  (Abnormal)  (Susceptibility) Collected: 03/21/24 2230    Order Status: Completed Specimen: Urine, clean catch Updated: 03/24/24 0752     Special Requests NO SPECIAL REQUESTS        [place] count --        >100,000  COLONIES/mL       Culture       Vancomycin resistant Enterococcus faecium          Susceptibility        Vancomycin Resistant Enterococcus faecium      BACTERIAL SUSCEPTIBILITY PANEL SANCHEZ      ampicillin >=32 ug/mL Resistant      ciprofloxacin >=8 ug/mL Resistant      DAPTOmycin 3 ug/mL Sensitive  [1]       levofloxacin >=8 ug/mL Resistant      linezolid 2 ug/mL Sensitive      nitrofurantoin 256 ug/mL Resistant      tetracycline >=16 ug/mL Resistant      vancomycin >=32 ug/mL Resistant                   [1]  Susceptible Dose Dependent  (SENSITIVITIES PERFORMED BY E-TEST)                     COVID-19 & Influenza Combo [[id number]] Collected: 03/21/24 1740    Order Status: Completed Specimen: Nasopharyngeal Updated: 03/21/24 1821     SARS-CoV-2, PCR Not detected        Comment: Not Detected results do not preclude SARS-CoV-2 infection and should not be used as the sole basis for patient management decisions.Results must be combined with clinical observations, patient history, and epidemiological information.        Rapid Influenza A By PCR Not detected        Rapid Influenza B By PCR Not detected        Comment: Testing was performed using mario Devika SARS-CoV-2 and Influenza A/B nucleic acid assay.  This test is a multiplex Real-Time Reverse Transcriptase Polymerase Chain Reaction (RT-PCR) based in vitro diagnostic test intended for the qualitative detection of nucleic acids from SARS-CoV-2, Influenza A, and Influenza B in nasopharyngeal for use under the FDA's Emergency Use Authorization(EAU) only.     Fact sheet for Patients: https://www.fda.gov/media/450131/download  Fact sheet

## 2024-03-26 ENCOUNTER — APPOINTMENT (OUTPATIENT)
Facility: HOSPITAL | Age: 64
DRG: 871 | End: 2024-03-26
Payer: MEDICARE

## 2024-03-26 LAB
ALBUMIN SERPL-MCNC: 1.5 G/DL (ref 3.4–5)
ALBUMIN/GLOB SERPL: 0.4 (ref 0.8–1.7)
ALP SERPL-CCNC: 138 U/L (ref 45–117)
ALT SERPL-CCNC: 9 U/L (ref 13–56)
ANION GAP SERPL CALC-SCNC: 8 MMOL/L (ref 3–18)
AST SERPL-CCNC: 13 U/L (ref 10–38)
BACTERIA SPEC CULT: ABNORMAL
BASOPHILS # BLD: 0 K/UL (ref 0–0.1)
BASOPHILS NFR BLD: 0 % (ref 0–2)
BILIRUB DIRECT SERPL-MCNC: <0.1 MG/DL (ref 0–0.2)
BILIRUB SERPL-MCNC: 0.2 MG/DL (ref 0.2–1)
BUN SERPL-MCNC: 63 MG/DL (ref 7–18)
BUN/CREAT SERPL: 16 (ref 12–20)
CA-I SERPL-SCNC: 1.13 MMOL/L (ref 1.12–1.32)
CALCIUM SERPL-MCNC: 8.1 MG/DL (ref 8.5–10.1)
CC UR VC: ABNORMAL
CHLORIDE SERPL-SCNC: 103 MMOL/L (ref 100–111)
CK SERPL-CCNC: 73 U/L (ref 26–192)
CO2 SERPL-SCNC: 26 MMOL/L (ref 21–32)
CREAT SERPL-MCNC: 4.06 MG/DL (ref 0.6–1.3)
DIFFERENTIAL METHOD BLD: ABNORMAL
EOSINOPHIL # BLD: 0.1 K/UL (ref 0–0.4)
EOSINOPHIL NFR BLD: 1 % (ref 0–5)
ERYTHROCYTE [DISTWIDTH] IN BLOOD BY AUTOMATED COUNT: 14.8 % (ref 11.6–14.5)
GLOBULIN SER CALC-MCNC: 3.4 G/DL (ref 2–4)
GLUCOSE BLD STRIP.AUTO-MCNC: 128 MG/DL (ref 70–110)
GLUCOSE BLD STRIP.AUTO-MCNC: 129 MG/DL (ref 70–110)
GLUCOSE BLD STRIP.AUTO-MCNC: 136 MG/DL (ref 70–110)
GLUCOSE BLD STRIP.AUTO-MCNC: 143 MG/DL (ref 70–110)
GLUCOSE BLD STRIP.AUTO-MCNC: 151 MG/DL (ref 70–110)
GLUCOSE BLD STRIP.AUTO-MCNC: 154 MG/DL (ref 70–110)
GLUCOSE SERPL-MCNC: 126 MG/DL (ref 74–99)
HBV SURFACE AB SER QL IA: POSITIVE
HBV SURFACE AB SERPL IA-ACNC: 67.76 MIU/ML
HCT VFR BLD AUTO: 30.1 % (ref 35–45)
HEP BS AB COMMENT: NORMAL
HGB BLD-MCNC: 9.3 G/DL (ref 12–16)
IMM GRANULOCYTES # BLD AUTO: 0 K/UL (ref 0–0.04)
IMM GRANULOCYTES NFR BLD AUTO: 0 % (ref 0–0.5)
LACTATE SERPL-SCNC: 1.5 MMOL/L (ref 0.4–2)
LYMPHOCYTES # BLD: 1.4 K/UL (ref 0.9–3.6)
LYMPHOCYTES NFR BLD: 17 % (ref 21–52)
MAGNESIUM SERPL-MCNC: 1.8 MG/DL (ref 1.6–2.6)
MCH RBC QN AUTO: 30.3 PG (ref 24–34)
MCHC RBC AUTO-ENTMCNC: 30.9 G/DL (ref 31–37)
MCV RBC AUTO: 98 FL (ref 78–100)
MONOCYTES # BLD: 0.9 K/UL (ref 0.05–1.2)
MONOCYTES NFR BLD: 11 % (ref 3–10)
NEUTS SEG # BLD: 5.6 K/UL (ref 1.8–8)
NEUTS SEG NFR BLD: 70 % (ref 40–73)
NRBC # BLD: 0 K/UL (ref 0–0.01)
NRBC BLD-RTO: 0 PER 100 WBC
PHOSPHATE SERPL-MCNC: 4.1 MG/DL (ref 2.5–4.9)
PLATELET # BLD AUTO: 154 K/UL (ref 135–420)
PMV BLD AUTO: 11.3 FL (ref 9.2–11.8)
POTASSIUM SERPL-SCNC: 3.3 MMOL/L (ref 3.5–5.5)
POTASSIUM SERPL-SCNC: 3.6 MMOL/L (ref 3.5–5.5)
PROT SERPL-MCNC: 4.9 G/DL (ref 6.4–8.2)
RBC # BLD AUTO: 3.07 M/UL (ref 4.2–5.3)
SERVICE CMNT-IMP: ABNORMAL
SODIUM SERPL-SCNC: 137 MMOL/L (ref 136–145)
T4 FREE SERPL-MCNC: 1.2 NG/DL (ref 0.7–1.5)
TSH SERPL DL<=0.05 MIU/L-ACNC: 1.36 UIU/ML (ref 0.36–3.74)
WBC # BLD AUTO: 8 K/UL (ref 4.6–13.2)

## 2024-03-26 PROCEDURE — 2580000003 HC RX 258: Performed by: INTERNAL MEDICINE

## 2024-03-26 PROCEDURE — 2700000000 HC OXYGEN THERAPY PER DAY

## 2024-03-26 PROCEDURE — 84443 ASSAY THYROID STIM HORMONE: CPT

## 2024-03-26 PROCEDURE — 2000000000 HC ICU R&B

## 2024-03-26 PROCEDURE — 90935 HEMODIALYSIS ONE EVALUATION: CPT

## 2024-03-26 PROCEDURE — 82550 ASSAY OF CK (CPK): CPT

## 2024-03-26 PROCEDURE — 82330 ASSAY OF CALCIUM: CPT

## 2024-03-26 PROCEDURE — 2580000003 HC RX 258: Performed by: PHYSICIAN ASSISTANT

## 2024-03-26 PROCEDURE — 6360000002 HC RX W HCPCS: Performed by: INTERNAL MEDICINE

## 2024-03-26 PROCEDURE — 80076 HEPATIC FUNCTION PANEL: CPT

## 2024-03-26 PROCEDURE — 83605 ASSAY OF LACTIC ACID: CPT

## 2024-03-26 PROCEDURE — 83735 ASSAY OF MAGNESIUM: CPT

## 2024-03-26 PROCEDURE — 84132 ASSAY OF SERUM POTASSIUM: CPT

## 2024-03-26 PROCEDURE — 2580000003 HC RX 258

## 2024-03-26 PROCEDURE — 85025 COMPLETE CBC W/AUTO DIFF WBC: CPT

## 2024-03-26 PROCEDURE — 6370000000 HC RX 637 (ALT 250 FOR IP): Performed by: INTERNAL MEDICINE

## 2024-03-26 PROCEDURE — 80048 BASIC METABOLIC PNL TOTAL CA: CPT

## 2024-03-26 PROCEDURE — 74018 RADEX ABDOMEN 1 VIEW: CPT

## 2024-03-26 PROCEDURE — 84100 ASSAY OF PHOSPHORUS: CPT

## 2024-03-26 PROCEDURE — 84439 ASSAY OF FREE THYROXINE: CPT

## 2024-03-26 PROCEDURE — 6360000002 HC RX W HCPCS

## 2024-03-26 RX ORDER — DEXTROSE AND SODIUM CHLORIDE 5; .9 G/100ML; G/100ML
INJECTION, SOLUTION INTRAVENOUS CONTINUOUS
Status: DISCONTINUED | OUTPATIENT
Start: 2024-03-26 | End: 2024-03-27

## 2024-03-26 RX ADMIN — DAPTOMYCIN 210 MG: 500 INJECTION, POWDER, LYOPHILIZED, FOR SOLUTION INTRAVENOUS at 22:30

## 2024-03-26 RX ADMIN — SODIUM CHLORIDE: 9 INJECTION, SOLUTION INTRAVENOUS at 08:18

## 2024-03-26 RX ADMIN — DEXTROSE AND SODIUM CHLORIDE: 5; 900 INJECTION, SOLUTION INTRAVENOUS at 10:15

## 2024-03-26 RX ADMIN — MEROPENEM 1000 MG: 1 INJECTION, POWDER, FOR SOLUTION INTRAVENOUS at 16:11

## 2024-03-26 RX ADMIN — APIXABAN 2.5 MG: 2.5 TABLET, FILM COATED ORAL at 21:02

## 2024-03-26 RX ADMIN — MIRTAZAPINE 7.5 MG: 15 TABLET, FILM COATED ORAL at 21:02

## 2024-03-26 RX ADMIN — FOLIC ACID 1 MG: 1 TABLET ORAL at 21:02

## 2024-03-26 RX ADMIN — DEXTROSE MONOHYDRATE: 100 INJECTION, SOLUTION INTRAVENOUS at 08:17

## 2024-03-26 RX ADMIN — SODIUM CHLORIDE, PRESERVATIVE FREE 10 ML: 5 INJECTION INTRAVENOUS at 21:00

## 2024-03-26 ASSESSMENT — PAIN SCALES - GENERAL
PAINLEVEL_OUTOF10: 0

## 2024-03-26 NOTE — PROGRESS NOTES
Speech-Language Pathology  Orders for re-evaluation received and chart reviewed. Attempted to see patient for diagnostic dysphagia therapy, but patient unable to open eyes or respond to max verbal and tactile stimulation including sternal rub. Recommend NPO with consideration of alternative means of nutrition if patient remains unable to maintain sufficient alertness for PO intake. D/w nursing. ST will continue to follow as appropriate.   Alicia Senior M.S., CCC-SLP

## 2024-03-26 NOTE — PLAN OF CARE
Discharge Instructions from  Inpatient Wound Care Nurse at [facility]   [address]   [address]  [place] [address]  [phone] Fax [phone]    NAME:  Rebeka Villegas  YOB: 1960  MEDICAL RECORD NUMBER:  [id number]  DATE:  3/24/2024    Wound Cleansing:   Do not scrub or use excessive force.  Cleanse wound prior to applying a clean dressing with:  [x] Normal Saline  or  [x] Wound Cleanser     Topical Treatments:  Do not apply lotions, creams, or ointments to wound bed unless directed.      Dressings:           Wound Location: Sacrum   [x] Apply Primary Dressing:       [x] Collagen with Silver    [x] Hydrofera Blue     [x] Cover and Secure with:     [x] Silicone border    Avoid contact of tape with skin.  [x] Change dressing: [x] Three times per week     Pressure Relief:  [x] When sitting, shift position or do seat lifts every 15 minutes.  [x] Turn every 2 hours when in bed.  Avoid position directing pressure on wound site.  Limit side lying to 30 degree tilt.  Limit HOB elevation to 30 degrees.           Electronically signed Brandie Bronson RN on 3/26/2024 at 9:35 AM

## 2024-03-26 NOTE — PROGRESS NOTES
Palliative Medicine    CODE STATUS: FULL CODE    AMD Status: on file naming Rakan Montalvo () and Dottie Peace (cousin) as her MPOAs     0915 Seen today in room 103 along with Kendra Griggs LMSW.  Lying supine with eyes closed.  Would not open them when requested. Did not answer any questions. Respirations unlabored on room air. Pale    Dialysis being planned for today.    0940: spoke with Dottie Peace, cousin/MPOA. Brief medical update given. She said that she had a conversation with Mr Montalvo and they both decided to support patient's choice for FULL CODE but no prolonged life support.     Discussed Mrs Villegas's nutritional state with dropping albumin. Ms Peace said that Mrs Villegas's appetite has been worsening over the past few months. Ms Peace thinks Mrs Villegas is very depressed by 1) recognition that her health decline will preclude her ever returning to her own home and 2) her 's hospice entry. Asked that Ms Peace consider enteral feeding as an option if the overall goal is to continue aggressive medical management. Ms Peace does understand that enteral feeding tubes are not necessarily lifelong.    Offered video call as I had with Mr Montalvo. Ms Peace said that she had a video call recently from [facility].      Disposition plan: anticipate she will return to [facility]. If she does not show significant improvement, may need to consider offering hospice as an option.    Palliative care will continue to follow mathew Villegas  and her family/MPOA during her hospitalization and support them as they make healthcare decisions and define goals of care.      Racheal Valles RN, MSN  Palliative Medicine  P: [phone]

## 2024-03-26 NOTE — PROGRESS NOTES
[facility]  Pulmonary, Critical Care, and Sleep Medicine    Name: Rebeka Villegas MRN: [id number]   : 1960 Hospital: [facility]    Date: 3/26/2024  Room: [address] [place] [address] Note                                                                             Consult requesting physician: Dr. BRANDON Hale  Reason for Consult: Sepsis    IMPRESSION:     Sepsis  Hypotension  Altered mentation  End-stage renal disease on dialysis  ICH 2023  Chronic diastolic CHF  Anemia of chronic disease  Hypertension  Type 2 diabetes mellitus with hyperglycemia  Atrial fibrillation        Active Hospital Problems    Diagnosis Date Noted    Sepsis ([facility]) [A41.9] 2024    Hypotension [I95.9] 2024    ESRD on hemodialysis (HCC) [N18.6, Z99.2] 2024    Altered mental status [R41.82] 2024    Atrial fibrillation ([facility]) [I48.91] 2024    SIRS (systemic inflammatory response syndrome) (Formerly [facility]) [R65.10] 2024    ICH (intracerebral hemorrhage) (Formerly [facility]) [I61.9] 2023    Pulmonary edema [J81.1] 2023    GERD (gastroesophageal reflux disease) [K21.9] 2023    Anemia of chronic disease [D63.8] 2023    Uncontrolled type 2 diabetes mellitus with hyperglycemia ([facility]) [E11.65] 2022    Chronic diastolic CHF (congestive heart failure) (Former[facility]) [I50.32] 2022    Primary hypertension [I10] 2015           Code status: Full Code       RECOMMENDATIONS:     Patient with multiple medical issues being transferred to ICU with VRE sepsis    Respiratory: Respirations remain stable; patient on room air  Chest x-ray 3/24-rotated film; no focal infiltrates or consolidation; no pleural effusion; right chest wall dialysis catheter  Abnormal VQ scan 2023; patient on chronic anticoagulation therapy with Eliquis 2.5 mg twice daily  Keep SPO2 >=92%. HOB 30 degree elevation all the time. Aspiration precautions.   CVS: Blood pressure stable-not needing  right  atrium.    Tricuspid Valve: Moderate (2+) transvalvular regurgitation. Moderate  pulmonary artery hypertension. RVSP is 53.0 mmHg.    Mitral Valve: Mild (1+) transvalvular regurgitation.    IVC/SVC: IVC diameter is greater than 21 mm and decreases greater than 50% during inspiration; therefore the estimated right atrial pressure is intermediate (~8 mmHg).    Extracardiac: Left pleural effusion.     VQ scan 12/24/2023-  [place] Impression  Abnormal left lung uptake.  Differential includes a high probability for acute pulmonary emboli.  However, based on the comparison chest x-ray, asymmetric lung disease could also give a similar appearance.           Please note: Voice-recognition software may have been used to generate this report, which may have resulted in some phonetic-based errors in grammar and contents. Even though attempts were made to correct all the mistakes, some may have been missed, and remained in the body of the document.    Mganus Dowling MD  3/26/2024

## 2024-03-26 NOTE — PROGRESS NOTES
Nephrology Progress note    Rebeka Villegas  MRN: [id number]  : 1960    Admit Date: 3/24/2024        Impression:     -ESRD on HD, TTS  -Shock, septic: improving, s/p IVF, and on abx  -VRE sepsis, source UTI, on Dapto  -Hx of urinary retention: s/p jean baptiste   -Anemia in CKD  -HFrEF  -Hypoalbuminemia   -Hx of CVA s/p Lt hemiplegia   -Afib  -PE  -Hypokalemia    Plan:     HD today with 4K dialysate bath  YOVANI  IV Antibiotics: On Daptomycin.  CPK normal  Recheck serum chemistry in a.m.        HPI:   DALLAS Villegas is 62 yo female with PMHx of ESRD on HD, HTN, CHF, CVA, Afib and PE who was called to come back from a nursing home for admission due to urine growing VRE on a sample taken during recent ER visit. Pt was admitted to the floor, then transferred to the ICU due to hypotension. She was given IVF boluses. Currently seen in ICU, pt is lethargic, BP improved, not on pressor. Her last HD was presumed to be on Saturday.      HD today, well tolerated    Principal Problem:    SIRS (systemic inflammatory response syndrome) (HCC)  Active Problems:    Chronic diastolic CHF (congestive heart failure) (HCC)    GERD (gastroesophageal reflux disease)    ICH (intracerebral hemorrhage) (HCC)    Anemia of chronic disease    Primary hypertension    Pulmonary edema    Uncontrolled type 2 diabetes mellitus with hyperglycemia (HCC)    Sepsis (HCC)    Hypotension    ESRD on hemodialysis (HCC)    Altered mental status    Atrial fibrillation (HCC)  Resolved Problems:    * No resolved hospital problems. *    Current Facility-Administered Medications   Medication Dose Route Frequency    dextrose 5 % and 0.9 % sodium chloride infusion   IntraVENous Continuous    meropenem (MERREM) 1,000 mg in sodium chloride 0.9 % 100 mL IVPB (mini-bag)  1,000 mg IntraVENous Q24H    sodium chloride flush 0.9 % injection 5-40 mL  5-40 mL IntraVENous 2 times per day    sodium chloride flush 0.9 % injection 5-40 mL  5-40 mL IntraVENous PRN    0.9 % sodium

## 2024-03-26 NOTE — PROGRESS NOTES
Subjective:    Weak   Sleepy   Responds to verbal     Review of systems:    Constitutional: denies fevers  Respiratory: denies SOB  Cardiovascular: denies chest pain  Gastrointestinal: denies nausea, vomiting, diarrhea      Vital signs/Intake and Output:  Visit Vitals  BP 95/64   Pulse 59   Temp 97.8 °F (36.6 °C) (Axillary)   Resp 10   SpO2 100%     Current Shift:  No intake/output data recorded.  Last three shifts:  03/24 1901 - 03/26 0700  In: 709.2 [P.O.:250; I.V.:109.2]  Out: 120 [Urine:120]    Exam:    General: frail appearing WF weak, awake, No distress  CVS:Regular rate and rhythm, no M/R/G, S1/S2 heard, no thrill  Lungs:Clear to auscultation bilaterally, no wheezes, rhonchi, or rales  Abdomen: Soft, Nontender, No distention, Normal Bowel sounds  Extremities:wasted musculature of lower ext, frail and generally very debilitated  Neuro:grossly normal, but very weakened  Psych:appropriate    Labs: Results:       Chemistry Recent Labs     03/24/24 1830 03/25/24 1339 03/26/24  0426   GLUCOSE 143* 80 126*    140 137   K 4.4 3.9 3.3*    104 103   CO2 23 27 26   BUN 54* 62* 63*   CREATININE 3.29* 3.87* 4.06*   CALCIUM 9.2 8.5 8.1*   BILITOT 0.4 0.4 0.2   AST 23 18 13   ALT 12* 12* 9*   ALKPHOS 196* 147* 138*   PROT 6.4 5.3* 4.9*   GLOB 4.2* 3.5 3.4   AGRATIO 0.5* 0.5* 0.4*   LABGLOM 15* 12* 12*        CBC w/Diff Recent Labs     03/24/24 1830 03/25/24  1339 03/26/24  0426   WBC 14.9* 10.5 8.0   RBC 3.85* 3.35* 3.07*   HGB 11.8* 10.3* 9.3*   HCT 38.7 32.9* 30.1*    186 154   LYMPHOPCT 8* 7* 17*        Cardiac Enzymes Recent Labs     03/25/24  0255 03/26/24  0426   CKTOTAL 154 73        Coagulation Recent Labs     03/25/24  1339   PROTIME 16.4*   INR 1.3*   APTT 36.8*         Lipid Panel No results found for: \"CHOL\", \"TRIG\", \"HDL\", \"LDLCHOLESTEROL\", \"LDLCALC\", \"VLDL\", \"CHOLHDLRATIO\"   BNP No results for input(s): \"BNP\" in the last 72 hours.   Liver Enzymes Recent Labs     03/26/24  0426   ALT

## 2024-03-26 NOTE — PROGRESS NOTES
Comprehensive Nutrition Assessment      Type and Reason for Visit:  Initial, Wound, Positive Nutrition Screen (MST = 3, PI stage 2)    Nutrition Recommendations/Plan:   Diet as ordered,   Add Mario (each provides 80 kcals, 2.5 g collagen protein, 300 mg vitamin C, 9.5 mg zinc)  Twice daily to promote wound healing   Continue to monitor tolerance of PO, compliance of oral supplements, weight, labs, and plan of care during admission.         Nutrition History and Allergies:   (Food/Nutr -related hx are obtained from chart review, as RD unable to connect with any of pt available .)  Hx of recent hemorrhagic stroke w/ chronic hemiplegia, CAD, Afib, recent PE, DM, ESRD on dialysis TTS, and sepsis, presented to the emergency room as a callback due to positive VRE in urine. Allrgic to fish, shelffish (unsure if pt is allergic to cheese, potato, and tomato)    Nutrition Assessment:      Rebeka Villegas is a 63 y.o. female  admitted on 3/25 for fatigue secondary to SIRS (systematic inflammatory response syndrome)  Per visit pt is laying in bed, resting with eyes close.   Wt gain noticed over the past year: +15.8% x 6 months. Still current BMI indicates underwt status.    Current on Dysphagia - Pureed diet -  po intake JASON as pt been sleeping during meal times per intensivist.   Noted trending down K+& Ca2+ -- Plan for replacement with dialysis per RN. K  Plan of care discussed with Dr. Dowling - plan for NG tube placement tomorrow if pt po intake remains poor. RD for TF order and management as needed.    Wt Readings from Last 10 Encounters:   03/21/24 52.6 kg (116 lb)   03/19/24 53 kg (116 lb 13.5 oz)   12/31/23 47.6 kg (105 lb)   09/30/23 43.1 kg (95 lb)        Nutrition Related Findings:    Pertinent meds: NaCl, NS, D10@50, oxycodone, folic acid. Pertinent labs: K+3.3 (low), eGFR 12, Ca2+ 8.1 (low), AlkPhos 138 (high)   Wound Type: Stage II, Pressure Injury (on sacrum)Last BM (including prior to admit):  03/25/24  Edema: Right upper extremity, +2, pitting      Current Nutrition Intake & Therapies:    Average Meal Intake: Unable to assess  Average Supplements Intake: None Ordered  ADULT DIET; Dysphagia - Pureed     Anthropometric Measures:  Height: 165.1 cm (5' 5\")  Ideal Body Weight (IBW): 125 lbs (57 kg)    Admission Body Weight: 52.6 kg (115 lb 15.4 oz)  Current Body Weight: 49.9 kg (110 lb 0.2 oz), 88 % IBW. Weight Source: Bed Scale  Current BMI (kg/m2): 18.3  Usual Body Weight: 43.1 kg (95 lb 0.3 oz) (noted on 9-30-23)  % Weight Change (Calculated): 15.8  Weight Adjustment For: No Adjustment                 BMI Categories: Underweight (BMI less than 18.5)    Estimated Daily Nutrient Needs:  Energy Requirements Based On: Kcal/kg  Weight Used for Energy Requirements: Current  Energy (kcal/day): 6989-9232 (kcal/kg)  Weight Used for Protein Requirements: Current  Protein (g/day): 60-75 (1.2-1.5 g/kg)  Method Used for Fluid Requirements: 1 ml/kcal  Fluid (ml/day): or per MD    Malnutrition Assessment:  Malnutrition Status:  Moderate malnutrition (03/26/24 1021)    Context:  Acute Illness     Findings of the 6 clinical characteristics of malnutrition:  Energy Intake:  Unable to assess  Weight Loss:  Unable to assess     Body Fat Loss:  Mild body fat loss Triceps   Muscle Mass Loss:  Moderate muscle mass loss Calf (gastrocnemius), Temples (temporalis)  Fluid Accumulation:  Mild Extremities (RUE, +2 pitting)   Strength:   (JASON)    Nutrition Diagnosis:   Predicted inadequate energy intake related to  (general weakness secondary to SIRS) as evidenced by  (no po intake since admission and pt been mostly asleep)  Moderate malnutrition, In context of acute illness or injury related to inadequate protein-energy intake as evidenced by Criteria as identified in malnutrition assessment    Nutrition Interventions:   Food and/or Nutrient Delivery: Continue Current Diet, Start Oral Nutrition Supplement  Nutrition

## 2024-03-26 NOTE — PROGRESS NOTES
Consulting with nephrology and HD RN regarding electrolyte replacement.   Potassium will be replaced during HD.

## 2024-03-26 NOTE — DIALYSIS
03/26/24  Dialysis treatment  Code Status-Full  Diagnosis-SIRS    Treatment time-3hrs  Fluid removed-0  BVP-50.7   Medications given-non eordered to be given during tx  Hepatitis Status-3/25/24 Ag-Neg, AB-pending    Access-  RT Chest CVC. Dressing c/d/I. Ports cleaned, blood aspirated and lines flushed without any issues. Good blood flow noted. No s/s of infection noted    Treatment-  1020 Dialysis started  1325 Dialysis completed and blood rinsed back. New caps placed on each port, pt stable    Pre and Post Report given Nemo Garrett RN

## 2024-03-26 NOTE — PROGRESS NOTES
[facility]  (A Division of [facility])    Follow-up Note  Dr Cotton will be back Wednesday morning.        Date of Admission: 3/24/2024       Date of Note:  3/26/2024    Summary:  Ms Villegas is a (old) 63y WF with underlying CVA hx DEC23 with L basal ganglia involvement who was recalled to ED 3/24 due to (+) VRE in Urine Cx from earlier ED visit 3/21 - and admitted, but on reviewing her chart, she has been hypotensive/non-verbal (to me) and weak overnight.  Sepsis?  Afebrile.  Has been dosed for VRE/daptomycin last night     Unknown Social Hx      CC:  Nonverbal  Today:  Events overnight reviewed - pt seen in new room/ICU  Little change other than her Bps are more robust and she's currently getting CRRT - tolerating well.    Still terribly non-verbal - eyes completely closed for me today  Resting.    Baseline Mentation?    Tm <100  WBC 8k    Current Antimicrobials:    Prior Antimicrobials:  Daptomycin IV (3/24-) #2  2.   Meropenem IV (3/25-) #1 Gentamicin IV (3/25)       Assessment: Rec / Plan:   Sepsis  Seems very stable today - all her BCx last few days remain sterile.  I'm inclined at this point to stop meropenem - and trend off it as she goes back upstairs to wren.  VRE getting treated by daptomycin every other day (ESRD).  Since she's not taking anything PO, will leave this in place rather than going to PO linezolid. ->dapto #2 and anibal #1    One more dose meropenem today, then stop it.  Sepsis?  Trend off it before the weekend    Dr Cotton here tomorrow   VRE UTI  Being treated with daptomycin    ESRD/HD     Recent L Basal Ganglia CVA   Baseline mentation state?    DMT2 - great control A1c 6.4%         Microbiology:             3/25 - BCx (-)                                      3/24 - BCx (-)                                                  UA (+) VRE                                         3/21 - UA (+) VRE        Lines / Catheters:      Tunneled RIJ/TDC and  management decisions.Results must be combined with clinical observations, patient history, and epidemiological information.        Rapid Influenza A By PCR Not detected        Rapid Influenza B By PCR Not detected        Comment: Testing was performed using mario Devika SARS-CoV-2 and Influenza A/B nucleic acid assay.  This test is a multiplex Real-Time Reverse Transcriptase Polymerase Chain Reaction (RT-PCR) based in vitro diagnostic test intended for the qualitative detection of nucleic acids from SARS-CoV-2, Influenza A, and Influenza B in nasopharyngeal for use under the FDA's Emergency Use Authorization(EAU) only.     Fact sheet for Patients: https://www.fda.gov/media/366048/download  Fact sheet for Healthcare Providers: https://www.fda.fov/media/479965/download         Culture, Urine [[id number]] Collected: 03/19/24 1535    Order Status: Completed Specimen: Urine, clean catch Updated: 03/21/24 1258     Special Requests NO SPECIAL REQUESTS        Culture No growth (<1,000 CFU/ML)                 Vinicius Valentine MD  Cell [phone]  [facility]   3/26/2024   1:16 PM

## 2024-03-27 ENCOUNTER — APPOINTMENT (OUTPATIENT)
Facility: HOSPITAL | Age: 64
DRG: 871 | End: 2024-03-27
Payer: MEDICARE

## 2024-03-27 PROBLEM — R64 CACHEXIA (HCC): Status: ACTIVE | Noted: 2024-03-27

## 2024-03-27 PROBLEM — E43 SEVERE MALNUTRITION (HCC): Status: ACTIVE | Noted: 2024-03-27

## 2024-03-27 LAB
ALBUMIN SERPL-MCNC: 1.7 G/DL (ref 3.4–5)
ALBUMIN/GLOB SERPL: 0.5 (ref 0.8–1.7)
ALP SERPL-CCNC: 164 U/L (ref 45–117)
ALT SERPL-CCNC: 15 U/L (ref 13–56)
ANION GAP SERPL CALC-SCNC: 7 MMOL/L (ref 3–18)
AST SERPL-CCNC: 25 U/L (ref 10–38)
BASOPHILS # BLD: 0 K/UL (ref 0–0.1)
BASOPHILS NFR BLD: 0 % (ref 0–2)
BILIRUB DIRECT SERPL-MCNC: 0.1 MG/DL (ref 0–0.2)
BILIRUB SERPL-MCNC: 0.3 MG/DL (ref 0.2–1)
BUN SERPL-MCNC: 35 MG/DL (ref 7–18)
BUN/CREAT SERPL: 14 (ref 12–20)
CA-I SERPL-SCNC: 1.08 MMOL/L (ref 1.12–1.32)
CALCIUM SERPL-MCNC: 7.9 MG/DL (ref 8.5–10.1)
CHLORIDE SERPL-SCNC: 103 MMOL/L (ref 100–111)
CO2 SERPL-SCNC: 28 MMOL/L (ref 21–32)
CREAT SERPL-MCNC: 2.48 MG/DL (ref 0.6–1.3)
DIFFERENTIAL METHOD BLD: ABNORMAL
EOSINOPHIL # BLD: 0.1 K/UL (ref 0–0.4)
EOSINOPHIL NFR BLD: 1 % (ref 0–5)
ERYTHROCYTE [DISTWIDTH] IN BLOOD BY AUTOMATED COUNT: 14.7 % (ref 11.6–14.5)
GLOBULIN SER CALC-MCNC: 3.2 G/DL (ref 2–4)
GLUCOSE BLD STRIP.AUTO-MCNC: 147 MG/DL (ref 70–110)
GLUCOSE BLD STRIP.AUTO-MCNC: 156 MG/DL (ref 70–110)
GLUCOSE BLD STRIP.AUTO-MCNC: 160 MG/DL (ref 70–110)
GLUCOSE BLD STRIP.AUTO-MCNC: 175 MG/DL (ref 70–110)
GLUCOSE SERPL-MCNC: 151 MG/DL (ref 74–99)
HCT VFR BLD AUTO: 34 % (ref 35–45)
HGB BLD-MCNC: 10.6 G/DL (ref 12–16)
IMM GRANULOCYTES # BLD AUTO: 0 K/UL (ref 0–0.04)
IMM GRANULOCYTES NFR BLD AUTO: 0 % (ref 0–0.5)
LACTATE SERPL-SCNC: 0.7 MMOL/L (ref 0.4–2)
LYMPHOCYTES # BLD: 1 K/UL (ref 0.9–3.6)
LYMPHOCYTES NFR BLD: 14 % (ref 21–52)
MAGNESIUM SERPL-MCNC: 1.7 MG/DL (ref 1.6–2.6)
MCH RBC QN AUTO: 30.2 PG (ref 24–34)
MCHC RBC AUTO-ENTMCNC: 31.2 G/DL (ref 31–37)
MCV RBC AUTO: 96.9 FL (ref 78–100)
MONOCYTES # BLD: 0.7 K/UL (ref 0.05–1.2)
MONOCYTES NFR BLD: 10 % (ref 3–10)
NEUTS SEG # BLD: 5.4 K/UL (ref 1.8–8)
NEUTS SEG NFR BLD: 75 % (ref 40–73)
NRBC # BLD: 0 K/UL (ref 0–0.01)
NRBC BLD-RTO: 0 PER 100 WBC
PHOSPHATE SERPL-MCNC: 2.7 MG/DL (ref 2.5–4.9)
PLATELET # BLD AUTO: 144 K/UL (ref 135–420)
PMV BLD AUTO: 11.1 FL (ref 9.2–11.8)
POTASSIUM SERPL-SCNC: 3.7 MMOL/L (ref 3.5–5.5)
PROT SERPL-MCNC: 4.9 G/DL (ref 6.4–8.2)
RBC # BLD AUTO: 3.51 M/UL (ref 4.2–5.3)
SODIUM SERPL-SCNC: 138 MMOL/L (ref 136–145)
WBC # BLD AUTO: 7.3 K/UL (ref 4.6–13.2)

## 2024-03-27 PROCEDURE — 83605 ASSAY OF LACTIC ACID: CPT

## 2024-03-27 PROCEDURE — 1100000003 HC PRIVATE W/ TELEMETRY

## 2024-03-27 PROCEDURE — 0DH67UZ INSERTION OF FEEDING DEVICE INTO STOMACH, VIA NATURAL OR ARTIFICIAL OPENING: ICD-10-PCS | Performed by: INTERNAL MEDICINE

## 2024-03-27 PROCEDURE — 83735 ASSAY OF MAGNESIUM: CPT

## 2024-03-27 PROCEDURE — 2580000003 HC RX 258: Performed by: INTERNAL MEDICINE

## 2024-03-27 PROCEDURE — 82962 GLUCOSE BLOOD TEST: CPT

## 2024-03-27 PROCEDURE — 6370000000 HC RX 637 (ALT 250 FOR IP): Performed by: INTERNAL MEDICINE

## 2024-03-27 PROCEDURE — 2500000003 HC RX 250 WO HCPCS: Performed by: INTERNAL MEDICINE

## 2024-03-27 PROCEDURE — 71045 X-RAY EXAM CHEST 1 VIEW: CPT

## 2024-03-27 PROCEDURE — 80048 BASIC METABOLIC PNL TOTAL CA: CPT

## 2024-03-27 PROCEDURE — 2700000000 HC OXYGEN THERAPY PER DAY

## 2024-03-27 PROCEDURE — 84100 ASSAY OF PHOSPHORUS: CPT

## 2024-03-27 PROCEDURE — 80076 HEPATIC FUNCTION PANEL: CPT

## 2024-03-27 PROCEDURE — 6360000002 HC RX W HCPCS: Performed by: INTERNAL MEDICINE

## 2024-03-27 PROCEDURE — 99233 SBSQ HOSP IP/OBS HIGH 50: CPT | Performed by: INTERNAL MEDICINE

## 2024-03-27 PROCEDURE — 85025 COMPLETE CBC W/AUTO DIFF WBC: CPT

## 2024-03-27 PROCEDURE — 51798 US URINE CAPACITY MEASURE: CPT

## 2024-03-27 PROCEDURE — 82330 ASSAY OF CALCIUM: CPT

## 2024-03-27 RX ORDER — CARVEDILOL 3.12 MG/1
6.25 TABLET ORAL 2 TIMES DAILY WITH MEALS
Status: DISCONTINUED | OUTPATIENT
Start: 2024-03-27 | End: 2024-03-29 | Stop reason: HOSPADM

## 2024-03-27 RX ADMIN — PREGABALIN 50 MG: 50 CAPSULE ORAL at 08:26

## 2024-03-27 RX ADMIN — APIXABAN 2.5 MG: 2.5 TABLET, FILM COATED ORAL at 08:27

## 2024-03-27 RX ADMIN — MEROPENEM 1000 MG: 1 INJECTION, POWDER, FOR SOLUTION INTRAVENOUS at 16:30

## 2024-03-27 RX ADMIN — SODIUM CHLORIDE, PRESERVATIVE FREE 10 ML: 5 INJECTION INTRAVENOUS at 22:14

## 2024-03-27 RX ADMIN — CALCIUM ACETATE 667 MG: 667 CAPSULE ORAL at 11:34

## 2024-03-27 RX ADMIN — CARVEDILOL 6.25 MG: 3.12 TABLET, FILM COATED ORAL at 16:34

## 2024-03-27 RX ADMIN — PANTOPRAZOLE SODIUM 40 MG: 40 TABLET, DELAYED RELEASE ORAL at 08:27

## 2024-03-27 RX ADMIN — CARVEDILOL 6.25 MG: 3.12 TABLET, FILM COATED ORAL at 11:34

## 2024-03-27 RX ADMIN — SODIUM CHLORIDE, PRESERVATIVE FREE 10 ML: 5 INJECTION INTRAVENOUS at 08:27

## 2024-03-27 RX ADMIN — ISOSORBIDE MONONITRATE 30 MG: 30 TABLET, EXTENDED RELEASE ORAL at 08:26

## 2024-03-27 RX ADMIN — CALCIUM ACETATE 667 MG: 667 CAPSULE ORAL at 16:34

## 2024-03-27 RX ADMIN — FOLIC ACID 1 MG: 1 TABLET ORAL at 22:00

## 2024-03-27 RX ADMIN — APIXABAN 2.5 MG: 2.5 TABLET, FILM COATED ORAL at 22:00

## 2024-03-27 RX ADMIN — MIRTAZAPINE 7.5 MG: 15 TABLET, FILM COATED ORAL at 22:00

## 2024-03-27 RX ADMIN — CALCIUM ACETATE 667 MG: 667 CAPSULE ORAL at 08:26

## 2024-03-27 ASSESSMENT — PAIN SCALES - PAIN ASSESSMENT IN ADVANCED DEMENTIA (PAINAD)
BREATHING: NORMAL
BODYLANGUAGE: RELAXED
FACIALEXPRESSION: SMILING OR INEXPRESSIVE
CONSOLABILITY: NO NEED TO CONSOLE
TOTALSCORE: 0

## 2024-03-27 ASSESSMENT — PAIN SCALES - GENERAL
PAINLEVEL_OUTOF10: 0

## 2024-03-27 NOTE — PROGRESS NOTES
Palliative Medicine follow-up progress note  [facility]: [phone] ([phone])  [facility]: [phone]     Patient Name: Rebeka Villegas  YOB: 1960    Date of Initial Consult: 3/25/24  Date of service:3/27/24  Reason for Consult: goals of care discussion/ACP/symptoms management  Requesting Provider:  Jevon Taveras MD   Primary Care Physician: Lester Minor MD      SUMMARY:     Rebeka Villegas is a 63 y.o. with a past history of hemorrhagic stroke with right-sided weakness, history of recent PE, atrial fibrillation, ESRD on hemodialysis, coronary artery disease, diabetes mellitus, hypertension and history of sepsis, who was admitted on 3/24/2024 from nursing home with a diagnosis of VRE UTI.  Patient initially presented to emergency room at the request for further treatment of VRE UTI that was diagnosed on previous ED visit.  Patient was started on daptomycin and infectious disease was consulted.  Nephrology was consulted for resumption of hemodialysis.  Patient has history of recent PE and was cleared to use Eliquis.  CT head was negative for any intracranial hemorrhage.  Patient subsequently developed hypotension and required ICU transfer.  Current medical issues leading to Palliative Medicine involvement include: To establish goals of care.    3/27/24: Patient was seen in presence of palliative care medical social worker.  Patient is lethargic and does not open or follow any verbal or tactile commands.  She is tolerating NG tube without any issues.    3/25/24: Patient was seen in presence of palliative care RN.  Patient opens eyes and follows some verbal commands.  Denies for having any pain.  Feeling tired.  No nausea or vomiting.  Mild right-sided upper extremity pain/discomfort present     HISTORY:     History obtained from: Medical records    CHIEF COMPLAINT: Was asked to come to the emergency room to be admitted for treatment of VRE UTI    HPI/SUBJECTIVE:   Glucose 151 (H) 74 - 99 mg/dL    BUN 35 (H) 7.0 - 18 MG/DL    Creatinine 2.48 (H) 0.6 - 1.3 MG/DL    Bun/Cre Ratio 14 12 - 20      Est, Glom Filt Rate 21 (L) >60 ml/min/1.73m2    Calcium 7.9 (L) 8.5 - 10.1 MG/DL   POCT Glucose    Collection Time: 03/27/24  8:05 AM   Result Value Ref Range    POC Glucose 160 (H) 70 - 110 mg/dL   POCT Glucose    Collection Time: 03/27/24 11:31 AM   Result Value Ref Range    POC Glucose 175 (H) 70 - 110 mg/dL             Total time: 50 minutes    Disclaimer: Sections of this note are dictated using utilizing voice recognition software, which may have resulted in some phonetic based errors in grammar and contents. Even though attempts were made to correct all the mistakes, some may have been missed, and remained in the body of the document. If questions arise, please contact our department.

## 2024-03-27 NOTE — PROGRESS NOTES
Nephrology Progress note    Rebeka Villegas  MRN: [id number]  : 1960    Admit Date: 3/24/2024        Impression:     -ESRD on HD, TTS  -Shock, septic: improved, s/p IVF, and on abx  -VRE sepsis, source UTI, on Dapto  -Hx of urinary retention: s/p jean baptiste   -Anemia in CKD  -HFrEF  -Hypoalbuminemia   -Hx of CVA s/p Lt hemiplegia   -Afib  -PE  -Hypokalemia, corrected    Plan:     HD tomorrow 3/28  YOVANI  IV Antibiotics: On Daptomycin.  CPK normal          HPI:   DALLAS Villegas is 62 yo female with PMHx of ESRD on HD, HTN, CHF, CVA, Afib and PE who was called to come back from a nursing home for admission due to urine growing VRE on a sample taken during recent ER visit. Pt was admitted to the floor, then transferred to the ICU due to hypotension. She was given IVF boluses. Currently seen in ICU, pt is lethargic, BP improved, not on pressor. Her last HD was presumed to be on Saturday.      Last HD session 3/26,  well tolerated  Now out of ICU.    Principal Problem:    SIRS (systemic inflammatory response syndrome) (HCC)  Active Problems:    Chronic diastolic CHF (congestive heart failure) (HCC)    GERD (gastroesophageal reflux disease)    ICH (intracerebral hemorrhage) (HCC)    Anemia of chronic disease    Primary hypertension    Pulmonary edema    Uncontrolled type 2 diabetes mellitus with hyperglycemia (HCC)    Sepsis (HCC)    Hypotension    ESRD on hemodialysis (HCC)    Altered mental status    Atrial fibrillation (HCC)    Severe malnutrition (HCC)    Cachexia (HCC)  Resolved Problems:    * No resolved hospital problems. *    Current Facility-Administered Medications   Medication Dose Route Frequency    carvedilol (COREG) tablet 6.25 mg  6.25 mg Oral BID     meropenem (MERREM) 1,000 mg in sodium chloride 0.9 % 100 mL IVPB (mini-bag)  1,000 mg IntraVENous Q24H    sodium chloride flush 0.9 % injection 5-40 mL  5-40 mL IntraVENous 2 times per day    sodium chloride flush 0.9 % injection 5-40 mL  5-40 mL IntraVENous PRN

## 2024-03-27 NOTE — PROGRESS NOTES
Speech-Language Pathology  1400: Attempted to see patient for follow up dysphagia management. Patient resting with eyes closed upon ST arrival. Grimaces in response name. Unable to open eyes or engage with ST. Not appropriate for skilled treatment right now, so will follow up later as schedule allows. Recommend continue NPO with tube feeds. Patient may have ice chips for comfort after oral care.   Alicia Senior M.S., CCC-SLP

## 2024-03-27 NOTE — PROGRESS NOTES
1127: Attempted to call report nurse unavailable.  1159: Report given to Nat ANTON RN. Transport placed.

## 2024-03-27 NOTE — CONSULTS
Nutrition Note    Type and Reason for Visit: Consult (appropriate TF formula selection per multiple allergies status)    Rebeka Villegas a 63 y.o. female with PMHx of ESRD on HD, HTN, CHF, CVA, Afib and PE is admitted for fatigue secondary to SIRS (systematic inflammatory response syndrome).   Current on HD TTS  On room air.   Meds & Labs reviewed -- D5 & D10 stopped, not on any pressor at this time. K+/Na+/Mg/Phos WNL  At present, NG tube placed and pt is on Jevity 1.5 @ 20ml/hr.   Pt has multiple allergies including fish, shellfish and cheese (unsure if lactose in-tolerance or galactosemia). Today RD able to reach out to pt POA, Ms. Dottie Peace, and confirm with her about pt allergy hx. POA is also unsure if patient have any milk-protein intolerance, but shares that pt can tolerate other dairy products and that she is ok with initiating TF formula for patient regarding the potential allergy risk.  Consider pt ESRD on dialysis status and her food allergy hx, this RD would recommend Nepro 1.8 formula as it is suitable for lactose intolerance and does not containing fish or shellfish. See below for TF order recommendation.    Current Nutrition Intake & Therapies:     ADULT DIET; Dysphagia - Pureed  ADULT ORAL NUTRITION SUPPLEMENT; Breakfast, Dinner; Wound Healing Oral Supplement  ADULT TUBE FEEDING; Nasogastric; Standard with Fiber; Continuous; 10; Yes; 10; Q 6 hours; 20; 100; Q 4 hours  Current TF & Flush Orders Provides: 720 kcal, 40 gm pro, 364 mL free water from tube feeding only, 964 mL total water, 50% RDIs    Nutrition Recommendations/Plan:   Modify current TF ordered  New Tube Feeding (TF) Orders:  Formula: Nepro (renal)  Schedule: Continuous  Feeding Regimen: Initial @ 15 ml/hr, advance 10 ml q 6 hr, goal @ 35 ml/hr  Additives/Modulars:  None  Water Flushes: 140 mL q 4 hr (840 mL total)  Goal TF & Flush Orders Provides: 1487 kcal, 68 gm pro, 611 mL free water from tube feeding only, 1451 mL total  water, 100% RDIs    Continue to monitor tolerance of TF, weight, labs, and plan of care during admission         Please refer to previous Nutrition Note on 3/26 for full assessment      Electronically signed by Aparna Biggs RD on 3/27/24 at 8:52 AM EDT    Contact: [phone]

## 2024-03-27 NOTE — PROGRESS NOTES
Hospitalist Progress Note    Patient: Rebeka Villegas MRN: [id number]  CSN: [id number]    YOB: 1960  Age: 63 y.o.  Sex: female    DOA: 3/24/2024 LOS:  LOS: 3 days          Chief Complaint:    sepsis      Assessment/Plan     62 yo dialysis patient sent back to ER for positive urine cx with VRE, from NH     Sepsis improved  AMS  ESRD  UTI-VRE  Debility and frailty  Hypotension-improved     Monitor hemodynamics  Albumin PRN lower BP     Resumed on coreg and imdur  Can resume hydralazine if BP trends up more     Can move back to floor today     Blood cx neg so far  daptomycin     CT head unrevealing     Albumin PRN low BP     End-stage renal disease on dialysis Tuesday Thursday Saturday     Atrial fibrillation rate controlled  recent hemorrhagic stroke and recent PE  Continue Eliquis twice daily     Pulmonary edema   HD today   No hypoxia or Sob seen  Telemetry monitoring    Dietary/nutritional support with tube feeds/ dysphagia diet      DM-hold lantus as low intake and AMS  Sliding scale insulin      Sacral decub  Wound care consult     Poor prognosis   pallative care consulted and MPOA contacted  Full code per paper work     Disposition :  Active Hospital Problems    Diagnosis     Sepsis ([facility]) [A41.9]     Hypotension [I95.9]     ESRD on hemodialysis ([facility]) [N18.6, Z99.2]     Altered mental status [R41.82]     Atrial fibrillation ([facility]) [I48.91]     SIRS (systemic inflammatory response syndrome) ([facility]) [R65.10]     ICH (intracerebral hemorrhage) ([facility]) [I61.9]     Pulmonary edema [J81.1]     GERD (gastroesophageal reflux disease) [K21.9]     Anemia of chronic disease [D63.8]     Uncontrolled type 2 diabetes mellitus with hyperglycemia ([facility]) [E11.65]     Chronic diastolic CHF (congestive heart failure) ([facility]) [I50.32]     Primary hypertension [I10]        Subjective:    She is lethargic  Weak  Did not talk to me  Cleared per intensivist for transfer back to floor  NG tube in for feedings    Review of

## 2024-03-27 NOTE — PROGRESS NOTES
[facility]  Pulmonary, Critical Care, and Sleep Medicine    Name: Rebeka Villegas MRN: [id number]   : 1960 Hospital: [facility]    Date: 3/27/2024  Room: [address] [place] [address] Note                                                                             Consult requesting physician: Dr. BRANDON Hale  Reason for Consult: Sepsis    IMPRESSION:     Sepsis  Hypotension  Altered mentation  End-stage renal disease on dialysis  ICH 2023  Chronic diastolic CHF  Anemia of chronic disease  Hypertension  Type 2 diabetes mellitus with hyperglycemia  Atrial fibrillation  Severe malnutrition        Active Hospital Problems    Diagnosis Date Noted    Severe malnutrition (HCC) [E43] 2024    Cachexia (HCC) [R64] 2024    Sepsis (HCC) [A41.9] 2024    Hypotension [I95.9] 2024    ESRD on hemodialysis (HCC) [N18.6, Z99.2] 2024    Altered mental status [R41.82] 2024    Atrial fibrillation ([facility]) [I48.91] 2024    SIRS (systemic inflammatory response syndrome) ([facility]) [R65.10] 2024    ICH (intracerebral hemorrhage) ([facility]) [I61.9] 2023    Pulmonary edema [J81.1] 2023    GERD (gastroesophageal reflux disease) [K21.9] 2023    Anemia of chronic disease [D63.8] 2023    Uncontrolled type 2 diabetes mellitus with hyperglycemia ([facility]) [E11.65] 2022    Chronic diastolic CHF (congestive heart failure) ([facility]) [I50.32] 2022    Primary hypertension [I10] 2015           Code status: Full Code       RECOMMENDATIONS:     Patient with multiple medical issues being transferred to ICU with VRE sepsis    Respiratory: Stable respirations; patient remains on room air  Chest x-ray 3/24-rotated film; no focal infiltrates or consolidation; no pleural effusion; right chest wall dialysis catheter  Abnormal VQ scan 2023; patient on chronic anticoagulation therapy with Eliquis 2.5 mg twice daily  Daily chest x-ray due to NG   28   BUN 62* 63*  --  35*   MG 2.0 1.8  --  1.7   PHOS 4.8 4.1  --  2.7   GLOB 3.5 3.4  --  3.2          Liver Enzymes Globulin   Date Value Ref Range Status   03/27/2024 3.2 2.0 - 4.0 g/dL Final     Recent Labs     03/25/24  1339 03/26/24  0426 03/27/24  0508   GLOB 3.5 3.4 3.2          CBC w/Diff Recent Labs     03/25/24  1339 03/26/24  0426 03/27/24  0508   WBC 10.5 8.0 7.3   RBC 3.35* 3.07* 3.51*   HGB 10.3* 9.3* 10.6*   HCT 32.9* 30.1* 34.0*    154 144            Results       Procedure Component Value Units Date/Time    Culture, Blood 2 [[id number]] Collected: 03/25/24 0255    Order Status: Completed Specimen: Blood Updated: 03/26/24 0719     Special Requests --        NO SPECIAL REQUESTS  LEFT       Culture NO GROWTH 1 DAY       Culture, Urine [[id number]]  (Abnormal) Collected: 03/24/24 2045    Order Status: Completed Specimen: Urine, clean catch Updated: 03/26/24 1036     Special Requests NO SPECIAL REQUESTS        [place] count --        >100,000  COLONIES/mL       Culture       Vancomycin resistant Enterococcus faecium REFER TO [id number] FOR SENSITIVITIES          Culture, Blood 1 [[id number]] Collected: 03/24/24 1830    Order Status: Completed Specimen: Blood Updated: 03/26/24 0719     Special Requests NO SPECIAL REQUESTS        Culture NO GROWTH 2 DAYS       Urine Culture [[id number]]  (Abnormal)  (Susceptibility) Collected: 03/21/24 2230    Order Status: Completed Specimen: Urine, clean catch Updated: 03/24/24 0752     Special Requests NO SPECIAL REQUESTS        [place] count --        >100,000  COLONIES/mL       Culture       Vancomycin resistant Enterococcus faecium          Susceptibility        Vancomycin Resistant Enterococcus faecium      BACTERIAL SUSCEPTIBILITY PANEL SANCHEZ      ampicillin >=32 ug/mL Resistant      ciprofloxacin >=8 ug/mL Resistant      DAPTOmycin 3 ug/mL Sensitive  [1]       levofloxacin >=8 ug/mL Resistant      linezolid 2 ug/mL Sensitive      nitrofurantoin 256 ug/mL

## 2024-03-27 NOTE — PROGRESS NOTES
opportunity to reach out to her family.      Dr. Tulio MD notified of this.      1145 hr-  LMSW received return call from pt's MPoA, Rakan Montalvo.  He asked what it would look like, if peg tube was not placed. LMSW explained it would be recommended for pt to be placed on comfort measures and allowed comfort feeds, if/when she is alert enough to eat, understanding risks involved. He asked how long NG tube can remain in place. LMSW informed him, up to 10-14 days, but if plan to pursue peg tube they would want to consult for that sooner than later.  He reports they are leaning against peg tube placement at this time, and asked if NG tube could remain in place for now, while he discusses with family. LMSW informed him it can.  LMSW did recommend considering making pt DNR / DNI, explaining burdens and benefits. Mr. Montalvo reports he anticipates he will transition pt to DNR / DNI by end of day, and they will have an answer reasonably soon regarding transitioning her to comfort measures, and DC with support of hospice.   No further questions or concerns at this time.     Thank you for this referral to Palliative Care. The palliative care team remains available to provide support to patient and their family.       Resuscitation Status: @FULL CODE@     Completed Documentation:  “An explanation of advance directives and their importance was provided and the following forms completed:”    [] Advance Directive  [] Portable DNR  [] POLST  [] [place] MOST  [] No new documents completed      I provided separately identifiable ACP services with the patient and/or surrogate decision maker in a voluntary, in-person conversation discussing the patient's wishes and goals as detailed in the above note.       Kendra Griggs LMSW, APHSW-C  Palliative Medicine Inpatient   [facility]  [phone]  [facility]   Palliative COPE Line: [phone] ([phone])

## 2024-03-27 NOTE — PROGRESS NOTES
[facility]  (A Division of [facility])                                                           Date of Admission: 3/24/2024       Reason for Consult: VRE infection  Referring MD: Dr Taveras       Current Antimicrobials:    Prior Antimicrobials:  Daptomycin 3/24 to date  Meropenem 3/22 to date CTX X1-3/22     Allergy to antibiotics: PCN, sulfa, ceftriaxone       Assessment--ID related:     Sepsis of unclear etiology  VRE UTI    Microlab data:  3/21- Viral test /PCR for COVID 19/Influenza A and B -negative.   3/21- UA WBC 11-20, urine culture >100K VRE    3/24- Blood culture X1- NGSF  3/24- UA 36-50, urine culture >100K VRE  3/25- Blood culture, NGSF    Co-morbidities:  ESRD on HD  DMT2  Recent L basal ganglia CVA    Recommendation -- ID related:     Cont daptomycin- Q48 hours. Weekly cpk check while on daptomycin  FU blood cultures- NGSF, can dc meropenem if no growth X72 hours       Today's Visit:     Patient transferred out of ICU today  NGT placed- due to poor oral intake  Afebrile  Awake but lethargic  Responds to name      Notes/Labs/Cultures and Imaging reports reviewed         HPI per Dr Valentine:      Rebeka Villegas is a 63 y.o. female with PMH of  CVA hx DEC23 with L basal ganglia involvement who was recalled to ED 3/24 due to (+) VRE in Urine Cx from earlier ED visit 3/21 - and admitted, but on reviewing her chart, she has been hypotensive/non-verbal (to me) and weak overnight.  Sepsis?  Afebrile.  Has been dosed for VRE/daptomycin last night     Unknown Social Hx        Past Medical History:   Diagnosis Date    Arthropathy     left knee    Asthma     seasonal    Atrial fibrillation (HCC)     CAD (coronary artery disease)     Cerebral artery occlusion with cerebral infarction (HCC)     Chronic kidney disease     Diabetes mellitus (HCC)     type 2- IDDM    Hemiplegia affecting dominant side, post-stroke (HCC)     Hypertension     Ketoacidosis      Admission to present as per radiology interpretation in [facility]    Radiology report last 24 hours:    XR ABDOMEN (KUB) (SINGLE AP VIEW)    Result Date: 3/26/2024  EXAM:  XR ABDOMEN (KUB) (SINGLE AP VIEW) INDICATION: NG tube placement COMPARISON: None. TECHNIQUE: Supine frontal abdomen (KUB). FINDINGS: No dilated small bowel. NG tube has been placed with the tip projecting over the fundus.     NG tube satisfactory position.       Ericka Cotton MD  [facility])  Office #:     [phone]  [phone]- Option #8   Office Fax: [phone]

## 2024-03-28 ENCOUNTER — APPOINTMENT (OUTPATIENT)
Facility: HOSPITAL | Age: 64
DRG: 871 | End: 2024-03-28
Payer: MEDICARE

## 2024-03-28 LAB
ALBUMIN SERPL-MCNC: 1.7 G/DL (ref 3.4–5)
ANION GAP SERPL CALC-SCNC: 6 MMOL/L (ref 3–18)
BASOPHILS # BLD: 0 K/UL (ref 0–0.1)
BASOPHILS NFR BLD: 1 % (ref 0–2)
BUN SERPL-MCNC: 43 MG/DL (ref 7–18)
BUN/CREAT SERPL: 14 (ref 12–20)
CALCIUM SERPL-MCNC: 8.2 MG/DL (ref 8.5–10.1)
CHLORIDE SERPL-SCNC: 101 MMOL/L (ref 100–111)
CO2 SERPL-SCNC: 28 MMOL/L (ref 21–32)
CREAT SERPL-MCNC: 3.01 MG/DL (ref 0.6–1.3)
DIFFERENTIAL METHOD BLD: ABNORMAL
EKG ATRIAL RATE: 61 BPM
EKG DIAGNOSIS: NORMAL
EKG P AXIS: 72 DEGREES
EKG P-R INTERVAL: 206 MS
EKG Q-T INTERVAL: 450 MS
EKG QRS DURATION: 94 MS
EKG QTC CALCULATION (BAZETT): 453 MS
EKG R AXIS: -77 DEGREES
EKG T AXIS: 69 DEGREES
EKG VENTRICULAR RATE: 61 BPM
EOSINOPHIL # BLD: 0.2 K/UL (ref 0–0.4)
EOSINOPHIL NFR BLD: 2 % (ref 0–5)
ERYTHROCYTE [DISTWIDTH] IN BLOOD BY AUTOMATED COUNT: 14.6 % (ref 11.6–14.5)
GLUCOSE BLD STRIP.AUTO-MCNC: 147 MG/DL (ref 70–110)
GLUCOSE BLD STRIP.AUTO-MCNC: 150 MG/DL (ref 70–110)
GLUCOSE BLD STRIP.AUTO-MCNC: 192 MG/DL (ref 70–110)
GLUCOSE BLD STRIP.AUTO-MCNC: 193 MG/DL (ref 70–110)
GLUCOSE SERPL-MCNC: 188 MG/DL (ref 74–99)
HCT VFR BLD AUTO: 33.2 % (ref 35–45)
HGB BLD-MCNC: 10.3 G/DL (ref 12–16)
IMM GRANULOCYTES # BLD AUTO: 0 K/UL (ref 0–0.04)
IMM GRANULOCYTES NFR BLD AUTO: 0 % (ref 0–0.5)
LYMPHOCYTES # BLD: 1.2 K/UL (ref 0.9–3.6)
LYMPHOCYTES NFR BLD: 16 % (ref 21–52)
MCH RBC QN AUTO: 30.5 PG (ref 24–34)
MCHC RBC AUTO-ENTMCNC: 31 G/DL (ref 31–37)
MCV RBC AUTO: 98.2 FL (ref 78–100)
MONOCYTES # BLD: 0.8 K/UL (ref 0.05–1.2)
MONOCYTES NFR BLD: 11 % (ref 3–10)
NEUTS SEG # BLD: 5.2 K/UL (ref 1.8–8)
NEUTS SEG NFR BLD: 70 % (ref 40–73)
NRBC # BLD: 0 K/UL (ref 0–0.01)
NRBC BLD-RTO: 0 PER 100 WBC
PHOSPHATE SERPL-MCNC: 2.9 MG/DL (ref 2.5–4.9)
PLATELET # BLD AUTO: 137 K/UL (ref 135–420)
PMV BLD AUTO: 12.1 FL (ref 9.2–11.8)
POTASSIUM SERPL-SCNC: 3.8 MMOL/L (ref 3.5–5.5)
RBC # BLD AUTO: 3.38 M/UL (ref 4.2–5.3)
SODIUM SERPL-SCNC: 135 MMOL/L (ref 136–145)
WBC # BLD AUTO: 7.5 K/UL (ref 4.6–13.2)

## 2024-03-28 PROCEDURE — 97602 WOUND(S) CARE NON-SELECTIVE: CPT

## 2024-03-28 PROCEDURE — 2580000003 HC RX 258: Performed by: INTERNAL MEDICINE

## 2024-03-28 PROCEDURE — 2580000003 HC RX 258

## 2024-03-28 PROCEDURE — 85025 COMPLETE CBC W/AUTO DIFF WBC: CPT

## 2024-03-28 PROCEDURE — 6370000000 HC RX 637 (ALT 250 FOR IP): Performed by: INTERNAL MEDICINE

## 2024-03-28 PROCEDURE — 99232 SBSQ HOSP IP/OBS MODERATE 35: CPT | Performed by: NURSE PRACTITIONER

## 2024-03-28 PROCEDURE — 90935 HEMODIALYSIS ONE EVALUATION: CPT

## 2024-03-28 PROCEDURE — 80069 RENAL FUNCTION PANEL: CPT

## 2024-03-28 PROCEDURE — 71045 X-RAY EXAM CHEST 1 VIEW: CPT

## 2024-03-28 PROCEDURE — 82962 GLUCOSE BLOOD TEST: CPT

## 2024-03-28 PROCEDURE — C9113 INJ PANTOPRAZOLE SODIUM, VIA: HCPCS | Performed by: INTERNAL MEDICINE

## 2024-03-28 PROCEDURE — 2500000003 HC RX 250 WO HCPCS: Performed by: INTERNAL MEDICINE

## 2024-03-28 PROCEDURE — 6360000002 HC RX W HCPCS: Performed by: INTERNAL MEDICINE

## 2024-03-28 PROCEDURE — 6360000002 HC RX W HCPCS: Performed by: HOSPITALIST

## 2024-03-28 PROCEDURE — 2700000000 HC OXYGEN THERAPY PER DAY

## 2024-03-28 PROCEDURE — 6360000002 HC RX W HCPCS

## 2024-03-28 PROCEDURE — A4216 STERILE WATER/SALINE, 10 ML: HCPCS | Performed by: INTERNAL MEDICINE

## 2024-03-28 PROCEDURE — 1100000003 HC PRIVATE W/ TELEMETRY

## 2024-03-28 PROCEDURE — 2580000003 HC RX 258: Performed by: PHYSICIAN ASSISTANT

## 2024-03-28 PROCEDURE — 36415 COLL VENOUS BLD VENIPUNCTURE: CPT

## 2024-03-28 RX ORDER — HYDRALAZINE HYDROCHLORIDE 20 MG/ML
10 INJECTION INTRAMUSCULAR; INTRAVENOUS EVERY 6 HOURS PRN
Status: DISCONTINUED | OUTPATIENT
Start: 2024-03-28 | End: 2024-03-29

## 2024-03-28 RX ADMIN — HYDRALAZINE HYDROCHLORIDE 10 MG: 20 INJECTION, SOLUTION INTRAMUSCULAR; INTRAVENOUS at 18:28

## 2024-03-28 RX ADMIN — APIXABAN 2.5 MG: 2.5 TABLET, FILM COATED ORAL at 21:37

## 2024-03-28 RX ADMIN — PANTOPRAZOLE SODIUM 40 MG: 40 INJECTION, POWDER, FOR SOLUTION INTRAVENOUS at 06:16

## 2024-03-28 RX ADMIN — ACETAMINOPHEN 650 MG: 325 TABLET ORAL at 22:11

## 2024-03-28 RX ADMIN — CALCIUM ACETATE 667 MG: 667 CAPSULE ORAL at 17:15

## 2024-03-28 RX ADMIN — SODIUM CHLORIDE, PRESERVATIVE FREE 10 ML: 5 INJECTION INTRAVENOUS at 10:20

## 2024-03-28 RX ADMIN — CARVEDILOL 6.25 MG: 3.12 TABLET, FILM COATED ORAL at 17:15

## 2024-03-28 RX ADMIN — DAPTOMYCIN 210 MG: 500 INJECTION, POWDER, LYOPHILIZED, FOR SOLUTION INTRAVENOUS at 22:06

## 2024-03-28 RX ADMIN — SODIUM CHLORIDE, PRESERVATIVE FREE 10 ML: 5 INJECTION INTRAVENOUS at 22:06

## 2024-03-28 RX ADMIN — FOLIC ACID 1 MG: 1 TABLET ORAL at 21:37

## 2024-03-28 ASSESSMENT — PAIN SCALES - GENERAL
PAINLEVEL_OUTOF10: 0

## 2024-03-28 NOTE — PROGRESS NOTES
acetaminophen (TYLENOL) suppository 650 mg  650 mg Rectal Q6H PRN Jevon Taveras MD        apixaban (ELIQUIS) tablet 2.5 mg  2.5 mg Oral BID Jevon Taveras MD   2.5 mg at 03/27/24 2200    acetaminophen (TYLENOL) tablet 650 mg  650 mg Oral Q6H PRN Jevon Taveras MD        [Held by provider] atorvastatin (LIPITOR) tablet 10 mg  10 mg Oral Nightly Jevon Taveras MD        calcium acetate (PHOSLO) capsule 667 mg  667 mg Oral TID WC Jevon Taveras MD   667 mg at 03/27/24 1634    folic acid (FOLVITE) tablet 1 mg  1 mg Oral Nightly Jevon Taveras MD   1 mg at 03/27/24 2200    isosorbide mononitrate (IMDUR) extended release tablet 30 mg  30 mg Oral Daily Jevon Taveras MD   30 mg at 03/27/24 0826    lidocaine 4 % external patch 1 patch  1 patch Topical Q24H Jevon Taveras MD   1 patch at 03/25/24 0237    oxyCODONE (ROXICODONE) immediate release tablet 5 mg  5 mg Oral Q4H PRN Jevon Taveras MD   5 mg at 03/25/24 0240    pregabalin (LYRICA) capsule 50 mg  50 mg Oral Daily Jevon Taveras MD   50 mg at 03/27/24 0826    albumin human 25% IV solution 25 g  25 g IntraVENous Q6H PRN Carlota Hale MD        DAPTOmycin (CUBICIN) 210 mg in sodium chloride 0.9 % 50 mL IVPB  4 mg/kg IntraVENous Q48H Kandace Nash PA   Stopped at 03/26/24 2306    glucose chewable tablet 16 g  4 tablet Oral PRN Jevon Taveras MD        dextrose bolus 10% 125 mL  125 mL IntraVENous PRN Jevon Taveras MD   Stopped at 03/25/24 1925    Or    dextrose bolus 10% 250 mL  250 mL IntraVENous PRN Jevon Taveras MD        glucagon injection 1 mg  1 mg SubCUTAneous PRN Jevon Taveras MD        dextrose 10 % infusion   IntraVENous Continuous PRN Jevon Taveras MD        insulin lispro (HUMALOG) injection vial 0-8 Units  0-8 Units SubCUTAneous TID WC Jevon Taveras MD        insulin lispro (HUMALOG) injection vial 0-4 Units  0-4 Units  [facility]  Office #:     [phone]  [phone]- Option #8   Office Fax: [phone]

## 2024-03-28 NOTE — PROGRESS NOTES
Nephrology Progress note    Rebeka Villegas  MRN: [id number]  : 1960    Admit Date: 3/24/2024        Impression:     -ESRD on HD, TTS  -Shock, septic: improved, s/p IVF, and on abx  -VRE sepsis, source UTI, on Dapto  -Hx of urinary retention: s/p jean baptiste   -Anemia in CKD  -HFrEF  -Hypoalbuminemia   -Hx of CVA s/p Lt hemiplegia   -Afib  -PE  -Hypokalemia, corrected    Plan:     HD today  YOVANI  IV Antibiotics: On Daptomycin.  CPK normal on 3/26/24          HPI:   DALLAS Villegas is 64 yo female with PMHx of ESRD on HD, HTN, CHF, CVA, Afib and PE who was called to come back from a nursing home for admission due to urine growing VRE on a sample taken during recent ER visit. Pt was admitted to the floor, then transferred to the ICU due to hypotension. She was given IVF boluses. Currently seen in ICU, pt is lethargic, BP improved, not on pressor. Her last HD was presumed to be on Saturday.      HD session today with UF 1L,  well tolerated      Principal Problem:    SIRS (systemic inflammatory response syndrome) (HCC)  Active Problems:    Chronic diastolic CHF (congestive heart failure) (HCC)    GERD (gastroesophageal reflux disease)    ICH (intracerebral hemorrhage) (HCC)    Anemia of chronic disease    Primary hypertension    Pulmonary edema    Uncontrolled type 2 diabetes mellitus with hyperglycemia (HCC)    Sepsis (HCC)    Hypotension    ESRD on hemodialysis (HCC)    Altered mental status    Atrial fibrillation (HCC)    Severe malnutrition (HCC)    Cachexia (HCC)  Resolved Problems:    * No resolved hospital problems. *    Current Facility-Administered Medications   Medication Dose Route Frequency    pantoprazole (PROTONIX) 40 mg in sodium chloride (PF) 0.9 % 10 mL injection  40 mg IntraVENous QAM AC    hydrALAZINE (APRESOLINE) injection 10 mg  10 mg IntraVENous Q6H PRN    carvedilol (COREG) tablet 6.25 mg  6.25 mg Oral BID WC    sodium chloride flush 0.9 % injection 5-40 mL  5-40 mL IntraVENous 2 times per day

## 2024-03-28 NOTE — PLAN OF CARE
Problem: Chronic Conditions and Co-morbidities  Goal: Patient's chronic conditions and co-morbidity symptoms are monitored and maintained or improved  3/28/2024 0840 by Chelsy Samuel RN  Outcome: Progressing  3/28/2024 0104 by Tho Michel RN  Outcome: Progressing  Flowsheets  Taken 3/27/2024 2000 by Tho Michel RN  Care Plan - Patient's Chronic Conditions and Co-Morbidity Symptoms are Monitored and Maintained or Improved:   Monitor and assess patient's chronic conditions and comorbid symptoms for stability, deterioration, or improvement   Collaborate with multidisciplinary team to address chronic and comorbid conditions and prevent exacerbation or deterioration   Update acute care plan with appropriate goals if chronic or comorbid symptoms are exacerbated and prevent overall improvement and discharge  Taken 3/27/2024 1245 by Nat Knott RN  Care Plan - Patient's Chronic Conditions and Co-Morbidity Symptoms are Monitored and Maintained or Improved:   Monitor and assess patient's chronic conditions and comorbid symptoms for stability, deterioration, or improvement   Collaborate with multidisciplinary team to address chronic and comorbid conditions and prevent exacerbation or deterioration   Update acute care plan with appropriate goals if chronic or comorbid symptoms are exacerbated and prevent overall improvement and discharge   Receiving dialysis today

## 2024-03-28 NOTE — PROGRESS NOTES
Palliative Medicine   [facility] [phone]  [facility] [phone]    CODE STATUS:  FULL CODE / FULL AGGRESSIVE MEASURES    AMD: Rakan Montalvo is Primary MpoA.     Rand Mello NP attended to pt at bedside for follow up assessment. Pt undergoing Hemodialysis at the time of visit. Pt did not awaken to interact with NP at time of visit.     Palliative team, Rand Mello NP and this SW made contact with MPoA, Rakan Montalvo at [phone], to follow up regarding conversations yesterday. He reports both of pt's sons should be here to visit her by end of day tomorrow. Rakan reports he plans to come down today or tomorrow to see her as well. He states until then he wants pt to remain full code with full aggressive measures.     NP addressed what happens with Hemodialysis, if/when pt transitioned to comfort measures. Rakan explained, it's his understanding dialysis would stop.  NP confirmed this and provided information regarding how that would impact left expectancy and what type of symptoms may be related to kidney failure during end of life. Rakan verbalized understanding.     SW informed Rakan, if they make a decision after close of business tomorrow, all he needs to do is notify ICU staff and they will make change to code status as appropriate.  Rakan verbalized understanding.  No further questions or concerns at this time.     Thank you for this referral to Palliative Care. The palliative care team remains available to provide support to patient and their family.     Kendra Griggs, DENISE, APHSW-C  Palliative Medicine Inpatient   [facility]  [phone]  [facility]   Palliative COPE Line: [phone])

## 2024-03-28 NOTE — PROGRESS NOTES
input(s): \"BNP\" in the last 72 hours.   Liver Enzymes Recent Labs     03/27/24  0508   ALT 15   AST 25   ALKPHOS 164*   BILITOT 0.3   BILIDIR 0.1      Thyroid Studies Lab Results   Component Value Date/Time    T4FREE 1.2 03/26/2024 04:38 AM          Procedures/imaging: see electronic medical records for all procedures/Xrays and details which were not copied into this note but were reviewed prior to creation of Plan      Carlota Hale MD

## 2024-03-28 NOTE — PROGRESS NOTES
Speech-Language Pathology   1350: Attempted to see patient for follow up dysphagia management. Patient asleep upon ST arrival, but opened eyes to max verbal/tactile stim. Unable to maintain alertness. Tube feeding in progress. Increased patient elevation. She is in somewhat contracted position, unable to tolerate reposition. Will follow up as appropriate.   Alicia Senior M.S., CCC-SLP

## 2024-03-28 NOTE — PROGRESS NOTES
Palliative Medicine follow-up progress note  [facility]: [phone]  [facility]: [phone]     Patient Name: Rebeka Villegas  YOB: 1960    Date of Initial Consult: 3/25/24  Date of service:3/28/24  Reason for Consult: goals of care discussion/ACP/symptoms management  Requesting Provider:  Jevon Taveras MD   Primary Care Physician: Lester Minor MD      SUMMARY:     Rebeka Villegas is a 63 y.o. with a past history of hemorrhagic stroke with right-sided weakness, history of recent PE, atrial fibrillation, ESRD on hemodialysis, coronary artery disease, diabetes mellitus, hypertension and history of sepsis, who was admitted on 3/24/2024 from nursing home with a diagnosis of VRE UTI.  Patient initially presented to emergency room at the request for further treatment of VRE UTI that was diagnosed on previous ED visit.  Patient was started on daptomycin and infectious disease was consulted.  Nephrology was consulted for resumption of hemodialysis.  Patient has history of recent PE and was cleared to use Eliquis.  CT head was negative for any intracranial hemorrhage.  Patient subsequently developed hypotension and required ICU transfer.  Current medical issues leading to Palliative Medicine involvement include: To establish goals of care.    3/28/2024: Patient is getting dialysis, drowsy, NG tube clamped.     3/27/24: Patient was seen in presence of palliative care medical social worker.  Patient is lethargic and does not open or follow any verbal or tactile commands.  She is tolerating NG tube without any issues.    3/25/24: Patient was seen in presence of palliative care RN.  Patient opens eyes and follows some verbal commands.  Denies for having any pain.  Feeling tired.  No nausea or vomiting.  Mild right-sided upper extremity pain/discomfort present     HISTORY:     History obtained from: Medical records    CHIEF COMPLAINT: Was asked to come to the emergency

## 2024-03-28 NOTE — WOUND CARE
IP WOUND CONSULT    Rebeka Villegas  MEDICAL RECORD NUMBER:  [id number]  AGE: 63 y.o.   GENDER: female  : 1960  TODAY'S DATE:  3/28/2024    GENERAL     [x] Follow-up   [] New Consult    [x] Present on Admission  [] Hospital Acquired    Rebeka Villegas is a 63 y.o. female referred by:   [x] Physician  [] Nursing  [] Other:         PAST MEDICAL HISTORY    Past Medical History:   Diagnosis Date    Arthropathy     left knee    Asthma     seasonal    Atrial fibrillation (HCC)     CAD (coronary artery disease)     Cerebral artery occlusion with cerebral infarction (HCC)     Chronic kidney disease     Diabetes mellitus (HCC)     type 2- IDDM    Hemiplegia affecting dominant side, post-stroke (HCC)     Hypertension     Ketoacidosis     Kidney stone     Pyelonephritis     Sepsis (HCC)     SVT (supraventricular tachycardia)     and afib    Urinary retention         PAST SURGICAL HISTORY    Past Surgical History:   Procedure Laterality Date    APPENDECTOMY      CHOLECYSTECTOMY      XR MIDLINE EQUAL OR GREATER THAN 5 YEARS  2022    XR MIDLINE EQUAL OR GREATER THAN 5 YEARS 2022       FAMILY HISTORY    Family History   Problem Relation Age of Onset    Hypertension Mother     Cancer Mother     Hypertension Father     Asthma Father        SOCIAL HISTORY    Social History     Tobacco Use    Smoking status: Never    Smokeless tobacco: Never   Substance Use Topics    Drug use: Never       ALLERGIES    Allergies   Allergen Reactions    Latex Anaphylaxis and Hives     itching    Pt reports latex jean baptiste is fine    Ceftriaxone Other (See Comments)     Has tolerated in , and     Fish Allergy Shortness Of Breath    Hydrochlorothiazide Other (See Comments) and Hives    Penicillins Rash and Itching     Tolerates ceftriaxone 2022    Shellfish-Derived Products Shortness Of Breath    Cheese     Iodine     Potato     Sulfa Antibiotics     Tomato        MEDICATIONS    No current facility-administered medications on file  prior to encounter.     Current Outpatient Medications on File Prior to Encounter   Medication Sig Dispense Refill    bumetanide (BUMEX) 2 MG tablet Take 1 tablet by mouth daily Mon, Wed, Fri      vitamin D (ERGOCALCIFEROL) 1.25 MG (36185 UT) CAPS capsule Take 1 capsule by mouth once a week Sat      isosorbide mononitrate (IMDUR) 60 MG extended release tablet Take 0.5 tablets by mouth daily      insulin glargine (LANTUS) 100 UNIT/ML injection vial Inject 12 Units into the skin daily      losartan (COZAAR) 50 MG tablet Take 2 tablets by mouth daily      omeprazole (PRILOSEC) 20 MG delayed release capsule Take 1 capsule by mouth daily      apixaban (ELIQUIS) 5 MG TABS tablet Take 0.5 tablets by mouth 2 times daily      carvedilol (COREG) 25 MG tablet Take 1 tablet by mouth 2 times daily (with meals)      lidocaine 4 % external patch Apply 1 patch topically every 24 hours      hydrALAZINE (APRESOLINE) 25 MG tablet Take 1.5 tablets by mouth 3 times daily      insulin lispro (HUMALOG) 100 UNIT/ML SOLN injection vial Inject 0-6 Units into the skin 3 times daily (before meals) Per sliding scale      oxyCODONE (ROXICODONE) 5 MG immediate release tablet Take 1 tablet by mouth every 4 hours as needed for Pain.      folic acid (FOLVITE) 1 MG tablet Take 1 tablet by mouth at bedtime      mirtazapine (REMERON) 15 MG tablet Take 0.5 tablets by mouth nightly      pregabalin (LYRICA) 50 MG capsule Take 1 capsule by mouth 2 times daily. Max Daily Amount: 100 mg      calcium acetate 667 MG TABS Take 667 mg by mouth 3 times daily (with meals)      atorvastatin (LIPITOR) 10 MG tablet Take 1 tablet by mouth nightly      acetaminophen (TYLENOL) 325 MG tablet Take 2 tablets by mouth every 6 hours as needed for Pain      cefUROXime (CEFTIN) 250 MG tablet Take 1 tablet by mouth 2 times daily for 7 days 14 tablet 0    fluconazole (DIFLUCAN) 100 MG tablet Take 2 tablets by mouth daily for 7 days 14 tablet 0    cephALEXin (KEFLEX) 500 MG

## 2024-03-28 NOTE — DIALYSIS
03/28/24  Dialysis treatment  Code Status-full  Diagnosis-SIRS    Treatment time-3hrs  Fluid removed-1kg  BVP-60.1  Medications given-none ordered to be given during tx  Hepatitis Status-3/25/24 AB-Imm, AG-NEG    Access-  Rt chest CVC. Dressing c/d/I and no s/s of infection noted. Ports cleaned, blood aspirated and lines flushed without any issues. Good blood flow noted    Treatment-  0830 Dialysis started  1130 Dialysis completed and blood rinsed back. New caps placed on each port, pt stable    Pre and Post Report given Teodora Figueroa RN

## 2024-03-28 NOTE — PLAN OF CARE
Problem: Discharge Planning  Goal: Discharge to home or other facility with appropriate resources  Outcome: Progressing  Flowsheets  Taken 3/27/2024 2000 by Tho Michel RN  Discharge to home or other facility with appropriate resources:   Identify barriers to discharge with patient and caregiver   Identify discharge learning needs (meds, wound care, etc)  Taken 3/27/2024 1245 by Nat Knott RN  Discharge to home or other facility with appropriate resources:   Identify barriers to discharge with patient and caregiver   Arrange for needed discharge resources and transportation as appropriate     Problem: Safety - Adult  Goal: Free from fall injury  Outcome: Progressing     Problem: Pain  Goal: Verbalizes/displays adequate comfort level or baseline comfort level  Outcome: Progressing  Flowsheets (Taken 3/27/2024 2000)  Verbalizes/displays adequate comfort level or baseline comfort level:   Encourage patient to monitor pain and request assistance   Assess pain using appropriate pain scale   Administer analgesics based on type and severity of pain and evaluate response   Implement non-pharmacological measures as appropriate and evaluate response     Problem: Chronic Conditions and Co-morbidities  Goal: Patient's chronic conditions and co-morbidity symptoms are monitored and maintained or improved  Outcome: Progressing  Flowsheets  Taken 3/27/2024 2000 by Tho Michel RN  Care Plan - Patient's Chronic Conditions and Co-Morbidity Symptoms are Monitored and Maintained or Improved:   Monitor and assess patient's chronic conditions and comorbid symptoms for stability, deterioration, or improvement   Collaborate with multidisciplinary team to address chronic and comorbid conditions and prevent exacerbation or deterioration   Update acute care plan with appropriate goals if chronic or comorbid symptoms are exacerbated and prevent overall improvement and discharge  Taken 3/27/2024 1245 by Nat Knott RN  Care Plan -  Patient's Chronic Conditions and Co-Morbidity Symptoms are Monitored and Maintained or Improved:   Monitor and assess patient's chronic conditions and comorbid symptoms for stability, deterioration, or improvement   Collaborate with multidisciplinary team to address chronic and comorbid conditions and prevent exacerbation or deterioration   Update acute care plan with appropriate goals if chronic or comorbid symptoms are exacerbated and prevent overall improvement and discharge     Problem: Skin/Tissue Integrity  Goal: Absence of new skin breakdown  Description: 1.  Monitor for areas of redness and/or skin breakdown  2.  Assess vascular access sites hourly  3.  Every 4-6 hours minimum:  Change oxygen saturation probe site  4.  Every 4-6 hours:  If on nasal continuous positive airway pressure, respiratory therapy assess nares and determine need for appliance change or resting period.  Outcome: Progressing     Problem: Nutrition Deficit:  Goal: Optimize nutritional status  Outcome: Progressing

## 2024-03-28 NOTE — PROGRESS NOTES
Per dietary note, Pt started on tube feeding at 15 mL/hr, with advancement of 10 mL/hr Q6 and free flush Q4 with 140 mL. Tube feed rate at 15 mL/hr when this RN came onto shift. Nat RN, changed rate to 25 mL/hr before going off shift around 1930. Pt tolerated well.     This RN in to pt room @ 0130 for reassessment. Tube feed rate changed to 35 mL/hr at this time. No complications at this time. Will continue to monitor.    Pt BP elevated 152/73 @ 0330 this AM. Pt has no PRN BP medications ordered. Dr. Vidal notified and verbalized to monitor BP. No new orders at this time.    Also messaged Dr. Vidal regarding an extra sliding scale that was ordered with no parameters. Verbal order to DC this scale since there is another sliding scale with parameters already ordered.     Also ordered was a PO protonix, however pt is NPO with medications being crushed and going thru NG tube. Verbal order from Dr. Vidal to change protonix to IV route for this reason.

## 2024-03-29 ENCOUNTER — HOSPICE ADMISSION (OUTPATIENT)
Age: 64
End: 2024-03-29
Payer: MEDICARE

## 2024-03-29 ENCOUNTER — APPOINTMENT (OUTPATIENT)
Facility: HOSPITAL | Age: 64
DRG: 871 | End: 2024-03-29
Payer: MEDICARE

## 2024-03-29 ENCOUNTER — HOME CARE VISIT (OUTPATIENT)
Age: 64
End: 2024-03-29
Payer: MEDICARE

## 2024-03-29 VITALS
HEART RATE: 71 BPM | BODY MASS INDEX: 18.48 KG/M2 | SYSTOLIC BLOOD PRESSURE: 140 MMHG | HEIGHT: 65 IN | OXYGEN SATURATION: 100 % | WEIGHT: 110.89 LBS | RESPIRATION RATE: 16 BRPM | DIASTOLIC BLOOD PRESSURE: 73 MMHG | TEMPERATURE: 98.4 F

## 2024-03-29 LAB
BASOPHILS # BLD: 0 K/UL (ref 0–0.1)
BASOPHILS NFR BLD: 1 % (ref 0–2)
DIFFERENTIAL METHOD BLD: ABNORMAL
EOSINOPHIL # BLD: 0.2 K/UL (ref 0–0.4)
EOSINOPHIL NFR BLD: 3 % (ref 0–5)
ERYTHROCYTE [DISTWIDTH] IN BLOOD BY AUTOMATED COUNT: 14.6 % (ref 11.6–14.5)
GLUCOSE BLD STRIP.AUTO-MCNC: 184 MG/DL (ref 70–110)
GLUCOSE BLD STRIP.AUTO-MCNC: 213 MG/DL (ref 70–110)
HCT VFR BLD AUTO: 32.4 % (ref 35–45)
HGB BLD-MCNC: 10.2 G/DL (ref 12–16)
IMM GRANULOCYTES # BLD AUTO: 0 K/UL (ref 0–0.04)
IMM GRANULOCYTES NFR BLD AUTO: 1 % (ref 0–0.5)
LYMPHOCYTES # BLD: 1.2 K/UL (ref 0.9–3.6)
LYMPHOCYTES NFR BLD: 15 % (ref 21–52)
MCH RBC QN AUTO: 30.5 PG (ref 24–34)
MCHC RBC AUTO-ENTMCNC: 31.5 G/DL (ref 31–37)
MCV RBC AUTO: 97 FL (ref 78–100)
MONOCYTES # BLD: 0.8 K/UL (ref 0.05–1.2)
MONOCYTES NFR BLD: 10 % (ref 3–10)
NEUTS SEG # BLD: 5.7 K/UL (ref 1.8–8)
NEUTS SEG NFR BLD: 71 % (ref 40–73)
NRBC # BLD: 0 K/UL (ref 0–0.01)
NRBC BLD-RTO: 0 PER 100 WBC
PLATELET # BLD AUTO: 136 K/UL (ref 135–420)
PMV BLD AUTO: 12.1 FL (ref 9.2–11.8)
RBC # BLD AUTO: 3.34 M/UL (ref 4.2–5.3)
SARS-COV-2 RDRP RESP QL NAA+PROBE: NOT DETECTED
SOURCE: NORMAL
WBC # BLD AUTO: 7.9 K/UL (ref 4.6–13.2)

## 2024-03-29 PROCEDURE — 0651 HSPC ROUTINE HOME CARE

## 2024-03-29 PROCEDURE — 36415 COLL VENOUS BLD VENIPUNCTURE: CPT

## 2024-03-29 PROCEDURE — 85025 COMPLETE CBC W/AUTO DIFF WBC: CPT

## 2024-03-29 PROCEDURE — 87635 SARS-COV-2 COVID-19 AMP PRB: CPT

## 2024-03-29 PROCEDURE — 2580000003 HC RX 258: Performed by: INTERNAL MEDICINE

## 2024-03-29 PROCEDURE — 2500000003 HC RX 250 WO HCPCS: Performed by: INTERNAL MEDICINE

## 2024-03-29 PROCEDURE — 82962 GLUCOSE BLOOD TEST: CPT

## 2024-03-29 PROCEDURE — C9113 INJ PANTOPRAZOLE SODIUM, VIA: HCPCS | Performed by: INTERNAL MEDICINE

## 2024-03-29 PROCEDURE — 6370000000 HC RX 637 (ALT 250 FOR IP): Performed by: INTERNAL MEDICINE

## 2024-03-29 PROCEDURE — 6360000002 HC RX W HCPCS: Performed by: INTERNAL MEDICINE

## 2024-03-29 PROCEDURE — 2500000001 HSPC NON INJECTABLE MED

## 2024-03-29 PROCEDURE — 92526 ORAL FUNCTION THERAPY: CPT

## 2024-03-29 PROCEDURE — A4216 STERILE WATER/SALINE, 10 ML: HCPCS | Performed by: INTERNAL MEDICINE

## 2024-03-29 PROCEDURE — G0299 HHS/HOSPICE OF RN EA 15 MIN: HCPCS

## 2024-03-29 PROCEDURE — 71045 X-RAY EXAM CHEST 1 VIEW: CPT

## 2024-03-29 RX ORDER — CARVEDILOL 6.25 MG/1
6.25 TABLET ORAL 2 TIMES DAILY WITH MEALS
Qty: 60 TABLET | Refills: 0
Start: 2024-03-29 | End: 2024-03-29 | Stop reason: HOSPADM

## 2024-03-29 RX ORDER — BISACODYL 10 MG
10 SUPPOSITORY, RECTAL RECTAL DAILY PRN
Status: DISCONTINUED | OUTPATIENT
Start: 2024-03-29 | End: 2024-03-29 | Stop reason: HOSPADM

## 2024-03-29 RX ORDER — SCOLOPAMINE TRANSDERMAL SYSTEM 1 MG/1
1 PATCH, EXTENDED RELEASE TRANSDERMAL
Status: DISCONTINUED | OUTPATIENT
Start: 2024-03-29 | End: 2024-03-29 | Stop reason: HOSPADM

## 2024-03-29 RX ORDER — LORAZEPAM 2 MG/ML
0.5 CONCENTRATE ORAL
Status: DISCONTINUED | OUTPATIENT
Start: 2024-03-29 | End: 2024-03-29 | Stop reason: HOSPADM

## 2024-03-29 RX ORDER — MORPHINE SULFATE 20 MG/ML
2.5 SOLUTION ORAL
Status: DISCONTINUED | OUTPATIENT
Start: 2024-03-29 | End: 2024-03-29 | Stop reason: HOSPADM

## 2024-03-29 RX ADMIN — CARVEDILOL 6.25 MG: 3.12 TABLET, FILM COATED ORAL at 08:44

## 2024-03-29 RX ADMIN — SODIUM CHLORIDE, PRESERVATIVE FREE 10 ML: 5 INJECTION INTRAVENOUS at 08:44

## 2024-03-29 RX ADMIN — CALCIUM ACETATE 667 MG: 667 CAPSULE ORAL at 12:25

## 2024-03-29 RX ADMIN — APIXABAN 2.5 MG: 2.5 TABLET, FILM COATED ORAL at 08:43

## 2024-03-29 RX ADMIN — INSULIN LISPRO 2 UNITS: 100 INJECTION, SOLUTION INTRAVENOUS; SUBCUTANEOUS at 08:43

## 2024-03-29 RX ADMIN — PANTOPRAZOLE SODIUM 40 MG: 40 INJECTION, POWDER, FOR SOLUTION INTRAVENOUS at 06:20

## 2024-03-29 RX ADMIN — PREGABALIN 50 MG: 50 CAPSULE ORAL at 08:44

## 2024-03-29 RX ADMIN — OXYCODONE 5 MG: 5 TABLET ORAL at 08:50

## 2024-03-29 RX ADMIN — CALCIUM ACETATE 667 MG: 667 CAPSULE ORAL at 08:44

## 2024-03-29 RX ADMIN — ISOSORBIDE MONONITRATE 30 MG: 30 TABLET, EXTENDED RELEASE ORAL at 08:44

## 2024-03-29 ASSESSMENT — PAIN SCALES - PAIN ASSESSMENT IN ADVANCED DEMENTIA (PAINAD)
TOTALSCORE: 0
CONSOLABILITY: DISTRACTED OR REASSURED BY VOICE/TOUCH
BODYLANGUAGE: RELAXED
CONSOLABILITY: NO NEED TO CONSOLE
BREATHING: NORMAL
BREATHING: NORMAL
TOTALSCORE: 2
BODYLANGUAGE: TENSE, DISTRESSED PACING, FIDGETING
FACIALEXPRESSION: SMILING OR INEXPRESSIVE
FACIALEXPRESSION: SMILING OR INEXPRESSIVE

## 2024-03-29 ASSESSMENT — PAIN SCALES - GENERAL: PAINLEVEL_OUTOF10: 0

## 2024-03-29 NOTE — PROGRESS NOTES
SPEECH LANGUAGE PATHOLOGY SWALLOW TREATMENT  Recommendations:   Puree diet with thin liquids  Meds crushed in puree  Feeding assistance as needed   Aspiration precautions  HOB >45 degrees during all intake and for at least 30 min after intake  Small bites/sips, Feed slowly, alternating bites/sips   Oral care three times daily   Dysphagia treatment    Goals:  Patient will:  1. Tolerate PO trials with 0 s/sx overt distress in 4/5 trials in order to determine least restrictive diet  2. Utilize compensatory swallow strategies/maneuvers (decrease bite/sip, size/rate, alt. liq/sol) with min cues in 4/5 trials in order to increase safety and tolerance of PO intake.         Patient: Rebeka Villegas (63 y.o. female)  Date: 3/29/2024  Primary Diagnosis: General weakness [R53.1]  SIRS (systemic inflammatory response syndrome) (HCC) [R65.10]  ESRD on hemodialysis (HCC) [N18.6, Z99.2]  Acute cystitis with hematuria [N30.01]  History of hemorrhagic cerebrovascular accident (CVA) with residual deficit [I69.30]       Precautions: Aspiration    Swallow Tx:   Patient seen today for follow up diagnostic dysphagia therapy. She is markedly more alert and verbal than previous visits. Patient somewhat slumped down in bed during tube feeding. Repositioned to ~45 degrees, as that was the most patient could tolerate. She was agreeable to PO trials. Tolerated multiple trials of ice chipsthin liquid via teaspoon, 1 oz puree without overt s/sx of aspiration. Oral transit a was timely and hyolaryngeal excursion was observed. Positive oral clearance noted post swallow, and there was no evidence of pharyngeal residue with phonation. Patient'd endurance is still low and she requires totalA for all feeding.     At this time, patient is considered appropriate for initiation of puree diet with thin liquids at MD's discretion. Continue strict aspiration precautions with emphasis on upright positioning for all tube feeds and PO. Continue oral care TID.  DIAGNOSIS: Dysphagia Diagnosis: Moderate oral stage dysphagia      PAIN:  Pain level pre-treatment: 0/10   Pain level post-treatment: 0/10     After treatment:   []            Patient left in no apparent distress sitting up in chair  [x]            Patient left in no apparent distress in bed  [x]            Call bell left within reach  [x]            Nursing notified  []            Family present  []            Caregiver present  []            Bed alarm activated    COMMUNICATION/EDUCATION:   []            Aspiration precautions; swallow safety; as well as compensatory speech/language/comprehension techniques provided via demonstration, verbalization and teach back of comprehension  []         Patient/family have participated as able in goal setting and plan of care.  []            Patient/family agree to work toward stated goals and plan of care.  []            Patient understands intent and goals of therapy, neutral about participation.  [x]            Patient unable to participate in goal setting/plan of care secondary to cognition, hearing/vision deficits; education ongoing with interdisciplinary staff   []            Handout regarding diet recommendations and thickener instructions provided.  []         Posted safety precautions in patient's room.    Thank you for this referral,  DALLAS Villatoro.S., CCC-SLP

## 2024-03-29 NOTE — DISCHARGE SUMMARY
Discharge Summary    Patient: Rebeka Villegas MRN: [id number]  [facility]: [id number]    YOB: 1960  Age: 63 y.o.  Sex: female    DOA: 3/24/2024 LOS:  LOS: 5 days   Discharge Date:      Primary Care Provider:  Lester Minor MD    Admission Diagnoses: General weakness [R53.1]  SIRS (systemic inflammatory response syndrome) ([facility]) [R65.10]  ESRD on hemodialysis (HCC) [N18.6, Z99.2]  Acute cystitis with hematuria [N30.01]  History of hemorrhagic cerebrovascular accident (CVA) with residual deficit [I69.30]    Discharge Diagnoses:    Active Hospital Problems    Diagnosis Date Noted    Severe malnutrition ([facility]) [E43] 03/27/2024    Cachexia (HCC) [R64] 03/27/2024    Sepsis ([facility]) [A41.9] 03/25/2024    Hypotension [I95.9] 03/25/2024    ESRD on hemodialysis (HCC) [N18.6, Z99.2] 03/25/2024    Altered mental status [R41.82] 03/25/2024    Atrial fibrillation ([facility]) [I48.91] 03/25/2024    SIRS (systemic inflammatory response syndrome) ([facility]) [R65.10] 03/24/2024    ICH (intracerebral hemorrhage) ([facility]) [I61.9] 11/13/2023    Pulmonary edema [J81.1] 08/23/2023    GERD (gastroesophageal reflux disease) [K21.9] 06/25/2023    Anemia of chronic disease [D63.8] 05/25/2023    Uncontrolled type 2 diabetes mellitus with hyperglycemia ([facility]) [E11.65] 04/02/2022    Chronic diastolic CHF (congestive heart failure) ([facility]) [I50.32] 03/31/2022    Primary hypertension [I10] 03/17/2015         Discharge Medications:        Medication List        CHANGE how you take these medications      carvedilol 6.25 MG tablet  Commonly known as: COREG  Take 1 tablet by mouth 2 times daily (with meals)  What changed:   medication strength  how much to take            CONTINUE taking these medications      acetaminophen 325 MG tablet  Commonly known as: TYLENOL     apixaban 5 MG Tabs tablet  Commonly known as: ELIQUIS     isosorbide mononitrate 60 MG extended release tablet  Commonly known as: IMDUR     lidocaine 4 % external patch     mirtazapine 15 MG

## 2024-03-29 NOTE — PROGRESS NOTES
DENISE spoke with RN with Hospice regarding DC plan. Family agreeable with DC back to LTC @ [facility]. [facility] to obtain a one time hospice contract with facility with [facility] admin @ [phone] prior to DC. RN provided info. Transport requested between 5-6pm for LTC w Hospice return. MD aware. DENISE reached out to DESEAN RAYMUNDO regarding update. LM for him to return call. Facility requiring a COVID test prior to return. Will add order.     [facility]       Address: [address] [place] [id number]    Phone: [phone]

## 2024-03-29 NOTE — PROGRESS NOTES
Nutrition Follow-up Assessment       Nutrition Recommendations/Plan:   [place] pt food intake and preference - feeding assistant as needed  Continue to monitor tolerance of PO, weight, labs, and plan of care during admission.           Patient Active Problem List   Diagnosis    CHF (congestive heart failure) (HCC)    Acute hypoxemic respiratory failure (HCC)    Acute pyelonephritis    Nephrolithiasis    RYANN (acute kidney injury) (HCC)    Chronic chest pain    Acute on chronic diastolic heart failure (HCC)    Chronic diastolic CHF (congestive heart failure) (HCC)    CKD (chronic kidney disease) stage 4, GFR 15-29 ml/min (HCC)    Coronary atherosclerosis    COVID-19    Diabetic ketoacidosis without coma associated with type 2 diabetes mellitus (HCC)    Elevated brain natriuretic peptide (BNP) level    Diuretic-induced hypokalemia    GERD (gastroesophageal reflux disease)    Hyperlipidemia    ICH (intracerebral hemorrhage) (HCC)    Anemia of chronic disease    Primary hypertension    PSVT (paroxysmal supraventricular tachycardia)    Pulmonary edema    Uncontrolled type 2 diabetes mellitus with hyperglycemia (HCC)    Vitamin D deficiency    SIRS (systemic inflammatory response syndrome) (HCC)    Sepsis (HCC)    Hypotension    ESRD on hemodialysis (HCC)    Altered mental status    Atrial fibrillation (HCC)    Severe malnutrition (HCC)    Cachexia (HCC)       Nutrition Assessment:    63 y.o. female is on hospital D#5 for Fatigue  Per visit pt is laying bed, awake, someverbal but inaudible.  No significant wt change compared to last visit.   NPO at present and remain on TF via NG tube with Nepro 1.8 formula run at 35ml/hr.   Mild trending Na+ noticed -- will lower water flushes rate.     13:59 KELLY Marie confirmed that pt is now DNR and TF will be off. Nutrition Recommendation/Plan updated.         3/29/2024     6:00 AM 3/28/2024     6:00 AM 3/21/2024     4:54 PM 3/19/2024    12:25 PM 12/31/2023     3:41 AM 9/30/2023     recommendation at this time  Coordination of Nutrition Care: Continue to monitor while inpatient, Feeding Assistance/Environment Change       Goals:     Goals: Initiate PO diet, within 2 days       Nutrition Monitoring and Evaluation:      Food/Nutrient Intake Outcomes: Food and Nutrient Intake  Physical Signs/Symptoms Outcomes: Biochemical Data, Weight    Discharge Planning:     (po diet/comfort care)     Aparna Biggs RD  Contact: [phone]

## 2024-03-29 NOTE — PROGRESS NOTES
Physician Progress Note      PATIENT:               BLANCA BRAR  CSN #:                  [id number]  :                       1960  ADMIT DATE:       3/24/2024 6:19 PM  DISCH DATE:  RESPONDING  PROVIDER #:        Carlota Hale MD        QUERY TEXT:    Type of Encephalopathy: Please provide further specificity, if known.    Clinical indicators include: dialysis, sepsis, ams, ncephalopathy, esrd,   end-stage renal disease, altered mental status, sirs, systemic inflammatory   response syndrome, hyperglycemia, fevers  Options provided:  -- Anoxic/hypoxic encephalopathy  -- Metabolic encephalopathy  -- Toxic encephalopathy  -- Hepatic encephalopathy  -- Hypertensive encephalopathy  -- Other - I will add my own diagnosis  -- Disagree - Not applicable / Not valid  -- Disagree - Clinically Unable to determine / Unknown        PROVIDER RESPONSE TEXT:    The patient has metabolic encephalopathy.          QUERY TEXT:    Pt admitted with sepsis and has moderate malnutrition documented in RD PN   3/26. Please further specify type of malnutrition with documentation in the   medical record.    The medical record reflects the following:  Risk Factors: ESRD, DM, anemia    Clinical Indicators:  Per RD-Malnutrition Status:  Moderate malnutrition (24 1021)  Context:  Acute Illness  Findings of the 6 clinical characteristics of malnutrition:  Energy Intake:  Unable to assess  Weight Loss:  Unable to assess  Body Fat Loss:  Mild body fat loss Triceps  Muscle Mass Loss:  Moderate muscle mass loss Calf (gastrocnemius), Temples   (temporalis)  Fluid Accumulation:  Mild Extremities (RUE, +2 pitting)   Strength: (JASON)  Ideal Body Weight (IBW): 125 lbs (57 kg)  Admission Body Weight: 52.6 kg (115 lb 15.4 oz)  Current Body Weight: 49.9 kg (110 lb 0.2 oz), 88 % IBW. Weight Source: Bed   Scale  Current BMI (kg/m2): 18.3  Usual Body Weight: 43.1 kg (95 lb 0.3 oz) (noted on 23)  % Weight Change (Calculated):

## 2024-03-29 NOTE — DISCHARGE SUMMARY
[facility]  DISCHARGE SUMMARY    Name:  BLANCA BRAR  MR#:   [id number]  :  1960  ACCOUNT #:  [id number]  ADMIT DATE:  2024  DISCHARGE DATE:  2024    row  DISCHARGE DIAGNOSES:  1.  Sepsis.  2.  Urinary tract infection.  3.  Metabolic encephalopathy.  4.  Prior stroke.  5.  End-stage renal disease, on dialysis.  6.  Debility and frailty.  7.  Sacral decubitus.    HOSPITAL SUMMARY:  This 63-year-old female was sent to the nursing home.  She was confused and was diagnosed with UTI.  It was consistent with VRE infection.  She had symptoms of sepsis and required aggressive supportive measures.  She was transferred to the intensive care unit intermittently to follow for other potential source of infection.  She was followed by Infectious Disease and Critical Care Pulmonology as well as Palliative Care while here.  The patient was given aggressive IV antibiotic coverage, supportive management of her blood pressure, fluid support.  She continued on a dialysis plan with consultation to Nephrology while she was here.  The patient was severely debilitated and encephalopathic, requiring a feeding tube to be placed in her nose for medications and nutritional support.  The patient was in poor condition with overall poor prognosis considering a history of stroke, debility, right-sided weakness, recent pulmonary embolism, and severe infection as well as superimposed on end-stage renal disease.  First, the family met after further and multiple discussions with Palliative Care.  They opted to transition into hospice.  The patient is now on comfort care hospice.    Today, she was examined by myself.  She is a frail elderly female with right-sided weakness, bedridden, awake, and able to communicate to some degree to complain of back pain.  NG tube in place.  Lungs:  Clear.  Cardiac exam:  Regular rate and rhythm.  Abdomen:  Benign.  Lower extremities:  Muscle wasting and weakness.  Her blood cultures

## 2024-03-29 NOTE — PLAN OF CARE
Problem: Discharge Planning  Goal: Discharge to home or other facility with appropriate resources  3/29/2024 0950 by Cynthia Chan RN  Outcome: Progressing  3/29/2024 0008 by Tho Michel RN  Outcome: Progressing  Flowsheets (Taken 3/28/2024 1954)  Discharge to home or other facility with appropriate resources:   Identify barriers to discharge with patient and caregiver   Identify discharge learning needs (meds, wound care, etc)     Problem: Safety - Adult  Goal: Free from fall injury  3/29/2024 0950 by Cynthia Chan RN  Outcome: Progressing  3/29/2024 0008 by Tho Michel RN  Outcome: Progressing     Problem: Pain  Goal: Verbalizes/displays adequate comfort level or baseline comfort level  3/29/2024 0950 by Cynthia Chan RN  Outcome: Progressing  3/29/2024 0008 by Tho Michel RN  Outcome: Progressing  Flowsheets (Taken 3/28/2024 2000)  Verbalizes/displays adequate comfort level or baseline comfort level:   Encourage patient to monitor pain and request assistance   Assess pain using appropriate pain scale   Administer analgesics based on type and severity of pain and evaluate response   Implement non-pharmacological measures as appropriate and evaluate response     Problem: Chronic Conditions and Co-morbidities  Goal: Patient's chronic conditions and co-morbidity symptoms are monitored and maintained or improved  3/29/2024 0950 by Cynthia Chan RN  Outcome: Progressing  3/29/2024 0008 by Tho Michel RN  Outcome: Progressing  Flowsheets (Taken 3/28/2024 1954)  Care Plan - Patient's Chronic Conditions and Co-Morbidity Symptoms are Monitored and Maintained or Improved:   Monitor and assess patient's chronic conditions and comorbid symptoms for stability, deterioration, or improvement   Collaborate with multidisciplinary team to address chronic and comorbid conditions and prevent exacerbation or deterioration   Update acute care plan with appropriate goals if chronic or comorbid symptoms are exacerbated  and prevent overall improvement and discharge     Problem: Skin/Tissue Integrity  Goal: Absence of new skin breakdown  Description: 1.  Monitor for areas of redness and/or skin breakdown  2.  Assess vascular access sites hourly  3.  Every 4-6 hours minimum:  Change oxygen saturation probe site  4.  Every 4-6 hours:  If on nasal continuous positive airway pressure, respiratory therapy assess nares and determine need for appliance change or resting period.  3/29/2024 0950 by Cynthia Chan, RN  Outcome: Progressing  3/29/2024 0008 by Tho Michel, RN  Outcome: Progressing     Problem: Nutrition Deficit:  Goal: Optimize nutritional status  3/29/2024 0950 by Cynthia Chan, RN  Outcome: Progressing  3/29/2024 0008 by Tho Michel, RN  Outcome: Progressing

## 2024-03-29 NOTE — PROGRESS NOTES
Hospitalist Progress Note    Patient: Rebeka Villegas MRN: [id number]  CSN: [id number]    YOB: 1960  Age: 63 y.o.  Sex: female    DOA: 3/24/2024 LOS:  LOS: 5 days          Chief Complaint:    UTI      Assessment/Plan       62 yo dialysis patient sent back to ER for positive urine cx with VRE, from NH     Sepsis improved  AMS/encephalopathy improved today, awake and can communicate  ESRD. T/Th/Sat  History of CVA, right side weakness, debility  UTI-VRE  Debility and frailty  Hypotension-improved     Monitor hemodynamics  Hydralazine PRN elevated BP     Resumed on coreg and imdur  Can resume hydralazine if BP trends up more     Blood cx neg so far  daptomycin     CT head unrevealing     End-stage renal disease on dialysis Tuesday Thursday Saturday     Atrial fibrillation rate controlled  recent hemorrhagic stroke and recent PE  Continue Eliquis twice daily     Pulmonary edema   No hypoxia or Sob seen  Telemetry monitoring     Dietary/nutritional support with tube feeds/ dysphagia diet      DM-hold lantus as low intake and AMS  Sliding scale insulin      Sacral decub  Wound care consult     Very Poor prognosis      Hold remeron     Meds via NG tube  NPO  Speech following     Hospice would be appropriate transition  Family making decisions re: this     Disposition :  Active Hospital Problems    Diagnosis     Severe malnutrition ([facility]) [E43]     Cachexia ([facility]) [R64]     Sepsis ([facility]) [A41.9]     Hypotension [I95.9]     ESRD on hemodialysis ([facility]) [N18.6, Z99.2]     Altered mental status [R41.82]     Atrial fibrillation ([facility]) [I48.91]     SIRS (systemic inflammatory response syndrome) ([facility]) [R65.10]     ICH (intracerebral hemorrhage) ([facility]) [I61.9]     Pulmonary edema [J81.1]     GERD (gastroesophageal reflux disease) [K21.9]     Anemia of chronic disease [D63.8]     Uncontrolled type 2 diabetes mellitus with hyperglycemia ([facility]) [E11.65]     Chronic diastolic CHF (congestive heart failure) ([facility]) [I50.32]

## 2024-03-29 NOTE — PROGRESS NOTES
Patient made hospice/comfort care    Will sign off. Please call back if questions or concerns. Thanks.      Ericka Cotton MD  [facility])  Office: [phone] -Option #8  Office fax:  [phone]

## 2024-03-29 NOTE — PROGRESS NOTES
0805  Assumed care. Assessment completed. Patient awake in bed. Weakness noted on right side extremities. Patient states that this is her normal. Bilateral legs semi contracted. Complained of sacral pain, repositioned for comfort. Flush of NG tolerated well. Call light in reach.    1200 Multiple family members have arrived to visit patient. Discussed the importance to wear proper PPE for patients isolation. They all verbalized an understanding.     1408  Removed patients telemetry. Remove NG tube. Tolerated well. Cath tip intact. Patient states \"Oh that feels so much better.\"     1640  Patient repositioned in bed. No current needs or complaints. Family at bedside.    1800 Attempted to call report to [facility]. [phone].     1911 Called report to Asia @ [facility]

## 2024-03-29 NOTE — PROGRESS NOTES
Chart reviewed.  Noted patient transitioned to comfort care today.  Spoke with patient's MPOA Rakan Montalvo by phone.  Discussed [facility] philosophy, services, criteria, and IDT.  Mr. Montalvo agrees to patient having hospice support at discharge.  Plan is for patient to return to [facility].  Advised patient may discharge today.  Mr. Montalvo is agreeable to this. He agreed to sign hospice consents electronically.  Provided with 24/7 contact information.  [facility] will admit following discharge.   Hospice is in the process of sending one-time contract to [facility].  SAUL Beltran made aware. CM to arrange transport for today 3/29.    Pt/family pursuing hospice: Yes  Admission dx approved by Hospice Medical Director: (Pending)  Anticipated hospital d/c date: 3/29/2024  Discussion occurred with patient and/or family about preference of hospitalization once admitted to hospice: Yes  Reviewed and discussed hospice philosophy and keeping the patient comfortable in their residence: Yes  Discussion with patient/family regarding around the clock caregiver in place due to patient safety in the home once discharged: Yes     DME: Oxygen.  Order: [id number]     Comfort Medications:  Hospice provider will send scripts for comfort medications to [facility] to be filled and sent home with the pt at discharge.     Thank you for the referral to [facility]. If we can be of further assistance please contact [phone].    Mihaela Mckeon MDIV, BSN, RN  Hospice Liaison  [address], [address]  [place] [address]  Office: [phone]  Fax: [phone]  Mobile:  [phone]  Email: [email or web address]

## 2024-03-29 NOTE — PROGRESS NOTES
Hospice referral received. Chart review in process.      Thank you for the referral to [facility]. If we can be of further assistance please contact [phone].     Mihaela Mckeon MDIV, BSN, RN  Hospice Liaison  [address], [place] [address]  Office: [phone]  Fax: [phone]  Mobile:  [phone]  Email: [email or web address]

## 2024-03-29 NOTE — PROGRESS NOTES
Nephrology Progress note    Rebeka Villegas  MRN: [id number]  : 1960    Admit Date: 3/24/2024        Impression:     -ESRD on HD, TTS  -Shock, septic: improved, s/p IVF, and on abx  -VRE sepsis, source UTI, on Dapto  -Hx of urinary retention: s/p jean baptiste   -Anemia in CKD  -HFrEF  -Hypoalbuminemia   -Hx of CVA s/p Lt hemiplegia   -Afib  -PE  -Hypokalemia, corrected    Plan:     HD tomorrow  YOVANI  IV Antibiotics: On Daptomycin.  CPK normal on 3/26/24          HPI:   DALLAS Villegas is 64 yo female with PMHx of ESRD on HD, HTN, CHF, CVA, Afib and PE who was called to come back from a nursing home for admission due to urine growing VRE on a sample taken during recent ER visit. Pt was admitted to the floor, then transferred to the ICU due to hypotension. She was given IVF boluses. Currently seen in ICU, pt is lethargic, BP improved, not on pressor. Her last HD was presumed to be on Saturday.      -No complaints. Last HD 3/28      Principal Problem:    SIRS (systemic inflammatory response syndrome) (HCC)  Active Problems:    Chronic diastolic CHF (congestive heart failure) (HCC)    GERD (gastroesophageal reflux disease)    ICH (intracerebral hemorrhage) (HCC)    Anemia of chronic disease    Primary hypertension    Pulmonary edema    Uncontrolled type 2 diabetes mellitus with hyperglycemia (HCC)    Sepsis (HCC)    Hypotension    ESRD on hemodialysis (HCC)    Altered mental status    Atrial fibrillation (HCC)    Severe malnutrition (HCC)    Cachexia (HCC)  Resolved Problems:    * No resolved hospital problems. *    Current Facility-Administered Medications   Medication Dose Route Frequency    pantoprazole (PROTONIX) 40 mg in sodium chloride (PF) 0.9 % 10 mL injection  40 mg IntraVENous QAM AC    hydrALAZINE (APRESOLINE) injection 10 mg  10 mg IntraVENous Q6H PRN    carvedilol (COREG) tablet 6.25 mg  6.25 mg Oral BID WC    sodium chloride flush 0.9 % injection 5-40 mL  5-40 mL IntraVENous 2 times per day    sodium chloride

## 2024-03-29 NOTE — PROGRESS NOTES
Palliative Medicine   [facility] [phone]  [facility] [phone]    CODE STATUS:  FULL CODE / FULL AGGRESSIVE MEASURES     AMD: Rakan Montalvo is Primary MpoA.     This LMSW consulted by Dr. Geoffrey MD, inquiring about discussions with MPoA, regarding decisions. LSMW informed her he stated both sons should be here by end of day, after which time he plans to make decision. LMSW informed MD, that he was notified, if he makes decision after normal business hours, he can notify medical staff on duty, and it does not have to be done through palliative team or day hospitalist following pt.  MD acknowledged information provided.     LMSW attended to pt at bedside for follow up visit. Pt laying in bed head leaning to right, NG tube in place, pt opened eyes immediately, when spoken to. No verbal response, and closed eyes.  No observed signs of discomfort at this time.     Pending visits from family and MPoA, prior to decision regarding transitioning pt to comfort measures and/or discharge back to [facility] with support of hospice.       LMSW notified pt's family at bedside wishing to speak with Palliative team.  This LMSW met with pt's family to discuss plans for pt's care, moving forward.  After discussion, pt's family opted to transition pt to comfort measures here in the hospital, with transition back to [facility], with support of hospice. Pt's MPoA, Rakan Montalvo, signed POLST on pt's behalf.  Dr. Geoffrey MD signed as provider.  RN notified of change in treatment plan and of DNR / DNI status.  Ruby Sen, with CM notified of change in treatment plan so DC back to facility can be accomplished as soon as possible.     Comfort care orders and hospice referral will be entered.      Advance Care Planning       Advanced Steps Advance Care Planning Conversation  POLST (Goals of Care for Serious Illness and/or Frailty)         Date of conversation: 03/29/24  Location:[facility]

## 2024-03-29 NOTE — PLAN OF CARE
Problem: Discharge Planning  Goal: Discharge to home or other facility with appropriate resources  Outcome: Progressing  Flowsheets (Taken 3/28/2024 1954)  Discharge to home or other facility with appropriate resources:   Identify barriers to discharge with patient and caregiver   Identify discharge learning needs (meds, wound care, etc)     Problem: Safety - Adult  Goal: Free from fall injury  Outcome: Progressing     Problem: Pain  Goal: Verbalizes/displays adequate comfort level or baseline comfort level  Outcome: Progressing  Flowsheets (Taken 3/28/2024 2000)  Verbalizes/displays adequate comfort level or baseline comfort level:   Encourage patient to monitor pain and request assistance   Assess pain using appropriate pain scale   Administer analgesics based on type and severity of pain and evaluate response   Implement non-pharmacological measures as appropriate and evaluate response     Problem: Chronic Conditions and Co-morbidities  Goal: Patient's chronic conditions and co-morbidity symptoms are monitored and maintained or improved  Outcome: Progressing  Flowsheets (Taken 3/28/2024 1954)  Care Plan - Patient's Chronic Conditions and Co-Morbidity Symptoms are Monitored and Maintained or Improved:   Monitor and assess patient's chronic conditions and comorbid symptoms for stability, deterioration, or improvement   Collaborate with multidisciplinary team to address chronic and comorbid conditions and prevent exacerbation or deterioration   Update acute care plan with appropriate goals if chronic or comorbid symptoms are exacerbated and prevent overall improvement and discharge     Problem: Skin/Tissue Integrity  Goal: Absence of new skin breakdown  Description: 1.  Monitor for areas of redness and/or skin breakdown  2.  Assess vascular access sites hourly  3.  Every 4-6 hours minimum:  Change oxygen saturation probe site  4.  Every 4-6 hours:  If on nasal continuous positive airway pressure, respiratory

## 2024-03-30 ENCOUNTER — HOME CARE VISIT (OUTPATIENT)
Age: 64
End: 2024-03-30
Payer: MEDICARE

## 2024-03-30 VITALS — OXYGEN SATURATION: 95 % | TEMPERATURE: 97.8 F | HEART RATE: 77 BPM | RESPIRATION RATE: 18 BRPM

## 2024-03-30 VITALS
TEMPERATURE: 99 F | RESPIRATION RATE: 18 BRPM | OXYGEN SATURATION: 99 % | DIASTOLIC BLOOD PRESSURE: 62 MMHG | HEART RATE: 74 BPM | SYSTOLIC BLOOD PRESSURE: 126 MMHG

## 2024-03-30 LAB
BACTERIA SPEC CULT: NORMAL
SERVICE CMNT-IMP: NORMAL

## 2024-03-30 PROCEDURE — 0651 HSPC ROUTINE HOME CARE

## 2024-03-30 PROCEDURE — G0299 HHS/HOSPICE OF RN EA 15 MIN: HCPCS

## 2024-03-30 ASSESSMENT — ENCOUNTER SYMPTOMS
HEMOPTYSIS: 0
CONTUSION: 1
PAIN LOCATION - PAIN QUALITY: ACHY
BOWEL INCONTINENCE: 1
STOOL DESCRIPTION: MUSHY
HEMOPTYSIS: 0

## 2024-03-30 NOTE — HOSPICE
Consents signed by LELE Montalvo,  in fact via esign. Consents reviewed and a copy left with Rand Ceballos LPN at [facility] when patient resides. Rand had no questions about the consents at this time. Admission to follow.

## 2024-03-30 NOTE — HOSPICE
Patient/Caregiver/Family/Facility findings/issues during SN visit:  admission follow up visit. No issues reported. Pt sleeping comfortably throughout visit.    Medication refills ordered this visit: none    Medications reconciled and all medications are available in the home this visit.  The following education was provided regarding medications, medication interactions, and look alike medications:  Response to teaching: fair. Medications are effective at this time.      Supplies by type and quantity ordered this visit include: NA    Consulted medical director/attending physician regarding: NA    Instructed patient/family/caregiver on 24-hour hospice availability and phone number.    Plan for next visit:  assessment, education

## 2024-03-30 NOTE — HOSPICE
Upon arrival to facility, patient is resting quietly in no acute distress with eyes closed. She arouses to verbal/touch stimuli. She is frail, chachetic and extremely soft spoken. She is alert only to herself. She is able to answer simple yes/no questions and provide simple answers. When I asked if she was in pain, she was able to say \"pretty much all over\" but unable to give me a number. During the assessment she has positive behavioral ques as she is grimacing and moaning when we turn her and pushing against us. When I ask her about being short of breath, she stated that she is \"feeling okay right now\". Skin is warm, dry and intact except for a stage 2 sacral wound. Wound was cleansed with soap and water and new bandage applied during assessment. Lungs are diminished throughout all lung fields. There is a double lumen subclavian dialysis access noted to upper right chest wall. Lumens are clamped with alcohol caps in place. Respirations are even and unlabored with oxygen sat of 99% on 2L via nasal canula. Abdomen is soft, non-tender and non-distended with bowel sounds present in all 4 quadrants. Patient has a brown, mushy/liquid bowel movement during assessment. It is also noted that she has a very large cluster of external hemorrhoids. Patient has a 16fr jean baptiste catheter in place, patent and draining without issue. Urine is yellow, clear with small amount of sediment noted. Perineal area cleansed during assessment d/t bowel movement. Patient's upper right arm has edema noted; 3+ pitting. Patient has 1+ pitting edema to right foot. Pedal pulses present bilatrally.    L briefs, chux, mouthswabs, basins and barrier creams x 2 left with Rand Ceballos LPN.  supplies ordered from [employer] for delivery to facility.    Comfort medications were verified to be in facility during admission assessment.          Hospice Admission Summary    Mrs. Rebeka Villegas, 63 year-old female, admitted to Hospice services for a terminal

## 2024-03-31 LAB
BACTERIA SPEC CULT: NORMAL
SERVICE CMNT-IMP: NORMAL

## 2024-03-31 PROCEDURE — 0651 HSPC ROUTINE HOME CARE

## 2024-04-01 ENCOUNTER — HOME CARE VISIT (OUTPATIENT)
Age: 64
End: 2024-04-01
Payer: MEDICARE

## 2024-04-01 PROCEDURE — 0651 HSPC ROUTINE HOME CARE

## 2024-04-01 PROCEDURE — G0156 HHCP-SVS OF AIDE,EA 15 MIN: HCPCS

## 2024-04-02 ENCOUNTER — HOME CARE VISIT (OUTPATIENT)
Age: 64
End: 2024-04-02
Payer: MEDICARE

## 2024-04-02 VITALS
TEMPERATURE: 97.4 F | SYSTOLIC BLOOD PRESSURE: 129 MMHG | RESPIRATION RATE: 18 BRPM | DIASTOLIC BLOOD PRESSURE: 81 MMHG | OXYGEN SATURATION: 100 % | HEART RATE: 58 BPM

## 2024-04-02 PROCEDURE — G0155 HHCP-SVS OF CSW,EA 15 MIN: HCPCS

## 2024-04-02 PROCEDURE — G0299 HHS/HOSPICE OF RN EA 15 MIN: HCPCS

## 2024-04-02 PROCEDURE — 0651 HSPC ROUTINE HOME CARE

## 2024-04-02 ASSESSMENT — ENCOUNTER SYMPTOMS
CONTUSION: 1
BOWEL INCONTINENCE: 1
STOOL DESCRIPTION: LOOSE

## 2024-04-02 NOTE — HOSPICE
Patient/Caregiver/Family/Facility findings/issues during SN visit: Patient nonresponsive to sternal rub. LPN Renetta reports patient has had no intake in the last 2 days and has been unresponsive to facility nursesand aides as well. Patient with O2 @ 2L via NC. Medina catheter draining yellow urine via gravity to drainage bag. Dressing to sacral wound CDI. Patient behavior negative for any pain indicators. FLACC 0/10. PPS: 20%. Patient to be placed on daily SN visits. Patient is transitioning.    Medication refills ordered this visit: N/A    Medications reconciled and all medications are available in the home this visit. Medications are effective at this time.      Supplies by type and quantity ordered this visit include: N/A    Consulted medical director/attending physician regarding: /A    Instructed patient/family/caregiver on 24-hour hospice availability and phone number.    Plan for next visit:  routine visit with EOL education and support

## 2024-04-02 NOTE — HOSPICE
in [place], [place].  CG unable to recall  providers name.  Bereavement risk is low for Dottie Peace. Family understands / availability of hospice staff and right to attend IDG meetings.  CG/Pt goals of care is for pt to remain pain free.

## 2024-04-02 NOTE — HOSPICE
Patient was in bed but didnt respond to the calling of her name. Staff stated that she didnt talk so I paryed for her. Family was called and they are doing well.

## 2024-04-03 ENCOUNTER — HOME CARE VISIT (OUTPATIENT)
Age: 64
End: 2024-04-03
Payer: MEDICARE

## 2024-04-03 VITALS — RESPIRATION RATE: 12 BRPM | TEMPERATURE: 97.3 F | HEART RATE: 74 BPM | OXYGEN SATURATION: 98 %

## 2024-04-03 PROCEDURE — G0299 HHS/HOSPICE OF RN EA 15 MIN: HCPCS

## 2024-04-03 PROCEDURE — 0651 HSPC ROUTINE HOME CARE

## 2024-04-03 ASSESSMENT — ENCOUNTER SYMPTOMS: BOWEL INCONTINENCE: 1

## 2024-04-03 NOTE — HOSPICE
Patient/Caregiver/Family/Facility findings/issues during SN visit:  Patient remains nonresponsive to sternal rub. Patient continues with no food or fluid. Medina catheter draining yellow urine via gravity to drainage bag. Patient behavior negative for any pain indicators. FLACC 0/10. PPS: 10%.    Medication refills ordered this visit: N/A    Medications reconciled and all medications are available in the home this visit. Medications are effective at this time.      Supplies by type and quantity ordered this visit include: N/A    Consulted medical director/attending physician regarding: N/A    Instructed patient/family/caregiver on 24-hour hospice availability and phone number.    Plan for next visit:  daily routine visit with continued EOL support and education

## 2024-04-04 ENCOUNTER — HOME CARE VISIT (OUTPATIENT)
Age: 64
End: 2024-04-04
Payer: MEDICARE

## 2024-04-04 VITALS — OXYGEN SATURATION: 100 % | RESPIRATION RATE: 12 BRPM | HEART RATE: 78 BPM | TEMPERATURE: 97.3 F

## 2024-04-04 PROCEDURE — 0651 HSPC ROUTINE HOME CARE

## 2024-04-04 PROCEDURE — G0299 HHS/HOSPICE OF RN EA 15 MIN: HCPCS

## 2024-04-04 PROCEDURE — G0156 HHCP-SVS OF AIDE,EA 15 MIN: HCPCS

## 2024-04-04 NOTE — HOSPICE
Patient/Caregiver/Family/Facility findings/issues during SN visit:  Patient opened eyes to tactile stimuli but remains nonverbal. Facility nurse reports patient ate 5 or 6 bites of breakfast this morning. Patient's face and right arm edematous. Medina catheter draining yellow urine. O2 @ 2L via NC. FLACC 0/10. Patient behavior negative for any pain indicators.    Medication refills ordered this visit: N/A    Medications reconciled and all medications are available in the home this visit. Medications are effective at this time.      Supplies by type and quantity ordered this visit include: N/A    Consulted medical director/attending physician regarding: N/A    Instructed patient/family/caregiver on 24-hour hospice availability and phone number.    Plan for next visit:  routine daily visit with continued EOL education and support

## 2024-04-05 ENCOUNTER — HOME CARE VISIT (OUTPATIENT)
Age: 64
End: 2024-04-05
Payer: MEDICARE

## 2024-04-05 VITALS — TEMPERATURE: 97.8 F | OXYGEN SATURATION: 100 % | RESPIRATION RATE: 14 BRPM | HEART RATE: 73 BPM

## 2024-04-05 PROCEDURE — 0651 HSPC ROUTINE HOME CARE

## 2024-04-05 PROCEDURE — G0299 HHS/HOSPICE OF RN EA 15 MIN: HCPCS

## 2024-04-05 ASSESSMENT — ENCOUNTER SYMPTOMS: BOWEL INCONTINENCE: 1

## 2024-04-05 NOTE — HOSPICE
Patient/Caregiver/Family/Facility findings/issues during SN visit:  Patient awake and responsive to daughter and spouse who are in visit. Patient is able to say a few words. Patient continues without intake. Daily SN visits continue. FLACC 0/10.     Medication refills ordered this visit: N/A    Medications reconciled and all medications are available in the home this visit. Medications are effective at this time.      Supplies by type and quantity ordered this visit include: 9x9 sacral optifoam    Consulted medical director/attending physician regarding: N/A    Instructed patient/family/caregiver on 24-hour hospice availability and phone number.    Plan for next visit:  routine visit with continued EOL education and support

## 2024-04-06 ENCOUNTER — HOME CARE VISIT (OUTPATIENT)
Age: 64
End: 2024-04-06
Payer: MEDICARE

## 2024-04-06 VITALS
TEMPERATURE: 98 F | DIASTOLIC BLOOD PRESSURE: 89 MMHG | OXYGEN SATURATION: 100 % | SYSTOLIC BLOOD PRESSURE: 159 MMHG | HEART RATE: 91 BPM | RESPIRATION RATE: 16 BRPM

## 2024-04-06 PROCEDURE — G0299 HHS/HOSPICE OF RN EA 15 MIN: HCPCS

## 2024-04-06 PROCEDURE — 0651 HSPC ROUTINE HOME CARE

## 2024-04-06 ASSESSMENT — ENCOUNTER SYMPTOMS
TROUBLE SWALLOWING: 1
BOWEL INCONTINENCE: 1
CONTUSION: 1

## 2024-04-06 NOTE — HOSPICE
Medication refills ordered this visit: No refills needed. Patient has alll comfort medications in the home.    Medications reconciled and all medications are available in the home this visit. Education was provided regarding medications, medication interactions, and look alike medications.     Response to teaching. Mary Goode LPN verbalized an understanding.    Supplies by type and quantity ordered this visit include: None    Consulted medical director/attending physician regarding: No contact needed during this visit    Instructed patient/family/caregiver on 24-hour hospice availability and phone number.    Plan for next visit: Continue to monitor imminence oral intake.

## 2024-04-07 ENCOUNTER — HOME CARE VISIT (OUTPATIENT)
Age: 64
End: 2024-04-07
Payer: MEDICARE

## 2024-04-07 PROCEDURE — G0299 HHS/HOSPICE OF RN EA 15 MIN: HCPCS

## 2024-04-07 PROCEDURE — 0651 HSPC ROUTINE HOME CARE

## 2024-04-08 ENCOUNTER — HOME CARE VISIT (OUTPATIENT)
Age: 64
End: 2024-04-08
Payer: MEDICARE

## 2024-04-08 VITALS
RESPIRATION RATE: 14 BRPM | DIASTOLIC BLOOD PRESSURE: 88 MMHG | OXYGEN SATURATION: 99 % | TEMPERATURE: 97.6 F | HEART RATE: 88 BPM | SYSTOLIC BLOOD PRESSURE: 138 MMHG

## 2024-04-08 PROCEDURE — G0299 HHS/HOSPICE OF RN EA 15 MIN: HCPCS

## 2024-04-08 NOTE — HOSPICE
Patient/Caregiver/Family/Facility findings/issues during SN visit:  No changes reported by facility staff. Patient taking bites of food. PPS: 20%.    Medication refills ordered this visit: N/A    Medications reconciled and all medications are available in the home this visit. Medications are effective at this time.      Supplies by type and quantity ordered this visit include: N/A    Consulted medical director/attending physician regarding: N/A    Instructed patient/family/caregiver on 24-hour hospice availability and phone number.    Plan for next visit:  routine visit with continued EOL education and support

## 2024-04-09 ENCOUNTER — HOME CARE VISIT (OUTPATIENT)
Age: 64
End: 2024-04-09
Payer: MEDICARE

## 2024-04-09 PROCEDURE — G0299 HHS/HOSPICE OF RN EA 15 MIN: HCPCS

## 2024-04-09 ASSESSMENT — ENCOUNTER SYMPTOMS
TROUBLE SWALLOWING: 1
BOWEL INCONTINENCE: 1
CONSTIPATION: 1

## 2024-04-10 ENCOUNTER — HOME CARE VISIT (OUTPATIENT)
Age: 64
End: 2024-04-10
Payer: MEDICARE

## 2024-04-10 VITALS
SYSTOLIC BLOOD PRESSURE: 174 MMHG | RESPIRATION RATE: 10 BRPM | DIASTOLIC BLOOD PRESSURE: 110 MMHG | HEART RATE: 86 BPM | OXYGEN SATURATION: 99 % | TEMPERATURE: 97.7 F

## 2024-04-10 PROCEDURE — G0155 HHCP-SVS OF CSW,EA 15 MIN: HCPCS

## 2024-04-10 PROCEDURE — G0299 HHS/HOSPICE OF RN EA 15 MIN: HCPCS

## 2024-04-10 ASSESSMENT — ENCOUNTER SYMPTOMS
CONSTIPATION: 1
BOWEL INCONTINENCE: 1

## 2024-04-10 NOTE — HOSPICE
Patient/Caregiver/Family/Facility findings/issues during SN visit:  Patient opened eyes to verbal stimuli and squeezed SN hands. Patient is reported to have ate 30% of her dinner last night. No new significant changes reported by facility LPN. Medina catheter draining cloudy, yellow urine via gravity to drainage bag. Patient's right forearm and hand with 2+ pitting edema and propped on pillow. Patient behavior is negative for any pain indicators. Patient to remain on SN daily visits at this time to monitor disease progression closely. PPS 20%.    Medication refills ordered this visit: N/A    Medications reconciled and all medications are available in the home this visit. Medications are effective at this time.      Supplies by type and quantity ordered this visit include: N/A    Consulted medical director/attending physician regarding: N/A    Instructed patient/family/caregiver on 24-hour hospice availability and phone number.    Plan for next visit:  routine visit with continued EOL educAiton and support

## 2024-04-11 ENCOUNTER — HOME CARE VISIT (OUTPATIENT)
Age: 64
End: 2024-04-11
Payer: MEDICARE

## 2024-04-11 VITALS
HEART RATE: 86 BPM | OXYGEN SATURATION: 100 % | SYSTOLIC BLOOD PRESSURE: 143 MMHG | RESPIRATION RATE: 12 BRPM | TEMPERATURE: 97.4 F | DIASTOLIC BLOOD PRESSURE: 92 MMHG

## 2024-04-11 ASSESSMENT — ENCOUNTER SYMPTOMS
CONSTIPATION: 1
BOWEL INCONTINENCE: 1

## 2024-04-11 NOTE — HOSPICE
Patient/Caregiver/Family/Facility findings/issues during SN visit:  Patient opened eyes briefly to tactile stimuli, non verbal this visit. Medina catheter draining yellow urine. FLACC 0/10. PPS: 20%. Patient taking bites of food occassionally.    Medication refills ordered this visit: N/A    Medications reconciled and all medications are available in the home this visit. Medications are effective at this time.      Supplies by type and quantity ordered this visit include: N/A    Consulted medical director/attending physician regarding: N/A    Instructed patient/family/caregiver on 24-hour hospice availability and phone number.    Plan for next visit:  routine visit with continued EOL education and support

## 2024-04-12 ENCOUNTER — HOME CARE VISIT (OUTPATIENT)
Age: 64
End: 2024-04-12
Payer: MEDICARE

## 2024-04-12 VITALS — RESPIRATION RATE: 14 BRPM | HEART RATE: 91 BPM | TEMPERATURE: 97.1 F | OXYGEN SATURATION: 100 %

## 2024-04-12 PROCEDURE — G0155 HHCP-SVS OF CSW,EA 15 MIN: HCPCS

## 2024-04-12 PROCEDURE — G0299 HHS/HOSPICE OF RN EA 15 MIN: HCPCS

## 2024-04-12 ASSESSMENT — ENCOUNTER SYMPTOMS
CONSTIPATION: 1
BOWEL INCONTINENCE: 1

## 2024-04-12 NOTE — HOSPICE
Patient/Caregiver/Family/Facility findings/issues during SN visit:  Patient consuming bites of one small meal most days. Medina catheter draining cloudy, yellow urine via gravity to drainage bag. FLACC 0/10. Patient opens eyes to verbal stimul but nonverbal with SN this visit. PPS:20%. SN visits x3 weekly.    Medication refills ordered this visit: N/A    Medications reconciled and all medications are/are not available in the home this visit.  Medications are effective at this time.      Supplies by type and quantity ordered this visit include: N/A    Consulted medical director/attending physician regarding: N/A    Instructed patient/family/caregiver on 24-hour hospice availability and phone number.    Plan for next visit: comprehensive visit with continued EOL education and support

## 2024-04-15 ENCOUNTER — HOME CARE VISIT (OUTPATIENT)
Age: 64
End: 2024-04-15
Payer: MEDICARE

## 2024-04-15 VITALS
DIASTOLIC BLOOD PRESSURE: 100 MMHG | SYSTOLIC BLOOD PRESSURE: 158 MMHG | OXYGEN SATURATION: 96 % | HEART RATE: 93 BPM | TEMPERATURE: 97.5 F | RESPIRATION RATE: 12 BRPM

## 2024-04-15 PROCEDURE — G0156 HHCP-SVS OF AIDE,EA 15 MIN: HCPCS

## 2024-04-15 PROCEDURE — G0299 HHS/HOSPICE OF RN EA 15 MIN: HCPCS

## 2024-04-15 PROCEDURE — G0155 HHCP-SVS OF CSW,EA 15 MIN: HCPCS

## 2024-04-15 ASSESSMENT — ENCOUNTER SYMPTOMS
BOWEL INCONTINENCE: 1
CONSTIPATION: 1

## 2024-04-15 NOTE — HOSPICE
Patient/Caregiver/Family/Facility findings/issues during SN visit:  Patient awoke to verbal stimuli. Patient spoke but speech incomprehensible. Patient's face edematous, right hand with 2+ pitting edema. No BLE edema. Patient hypertensive 158/100. Respiratory rate 12, even and unlabored. O2 @ 2L via NC. Patient behavior negative for pain indicators. Medina catheter draining yellow urine via gravity to drainage bag. Facility RN on unit reports patient has had no intake since last Friday. Patient being placed on daily visits. PPS: 10%.    Medication refills ordered this visit: N/A    Medications reconciled and all medications are in the home this visit. Medications are effective at this time.      Supplies by type and quantity ordered this visit include: N/A    Consulted medical director/attending physician regarding: N/A    Instructed patient/family/caregiver on 24-hour hospice availability and phone number.    Plan for next visit: routine visit with continued EOL education and support

## 2024-04-15 NOTE — HOSPICE
The purpose of today's visit is to offer increased support to pt/staff due to pt's transitioning.  Pt was received in her SNF room, head inclined, and eyes open. She was dressed appropriately and there are no visible signs of abuse/neglect. The pts room is clean and present no visible safety concerns.  SW spoke to pt and introduced self to pt. She is alert to self only. Pt was able to make brief eye contact and lifted her hand and waved.  Pt, although awake the entire time, was unable to maintain eye contact for any notable time.  SW was expecting to meet pt's cousin as discussed earlier in the week with CG.  SW asked pt if cousin, Dottie had been to visit and pt was able to shake her head no.  Pt appears unable to verbally communicate, but is able to nod head to yes/no questions.  Pt appears comfortable with no facial cues of pain/discomfort.  DENISE spoke with facility RN.  RN reports that pt was able to speak until about 3 days ago.  RN reports pt is eating minimal food/drink.  RN reports no family visits today that she is aware of.  RN reports that pt's son has made several visits, as well as pt's spouse.  SW will continue to make visits at 2 x's week for the next week and then reassess needs with CG.

## 2024-04-16 ENCOUNTER — HOME CARE VISIT (OUTPATIENT)
Age: 64
End: 2024-04-16
Payer: MEDICARE

## 2024-04-16 VITALS
DIASTOLIC BLOOD PRESSURE: 108 MMHG | TEMPERATURE: 97.1 F | OXYGEN SATURATION: 100 % | RESPIRATION RATE: 12 BRPM | SYSTOLIC BLOOD PRESSURE: 169 MMHG | HEART RATE: 90 BPM

## 2024-04-16 PROCEDURE — G0299 HHS/HOSPICE OF RN EA 15 MIN: HCPCS

## 2024-04-16 ASSESSMENT — ENCOUNTER SYMPTOMS: BOWEL INCONTINENCE: 1

## 2024-04-17 ENCOUNTER — HOME CARE VISIT (OUTPATIENT)
Age: 64
End: 2024-04-17
Payer: MEDICARE

## 2024-04-17 VITALS
DIASTOLIC BLOOD PRESSURE: 72 MMHG | OXYGEN SATURATION: 99 % | HEART RATE: 66 BPM | RESPIRATION RATE: 10 BRPM | TEMPERATURE: 97.3 F | SYSTOLIC BLOOD PRESSURE: 110 MMHG

## 2024-04-17 PROCEDURE — G0299 HHS/HOSPICE OF RN EA 15 MIN: HCPCS

## 2024-04-17 ASSESSMENT — ENCOUNTER SYMPTOMS: BOWEL INCONTINENCE: 1

## 2024-04-17 NOTE — HOSPICE
Patient/Caregiver/Family/Facility findings/issues during SN visit:  Patient opened eyes to verbal stimuli, said a few words although incomprehensible. Patient's face is extremely edematous, left eye swollen shut and 2+ pitting edema to right hand. Hand is propped on pillow. Medina catheter draining minimal amount of yellow urine via gravity to drainage bag. Facility LPN clenased sacral wound and put Triad and new foam bandage on it while SN assisted in holding patient on her side. Patient experienced discomfort with being turned and repositioning and SN asked facility LPN to administer Morphine to patient. Facility LPN administered Morphine 0.25 mL to patient. Patient's lunch tray arrived at end of visit and patient noted to take only a few bites before being done.    Medication refills ordered this visit: N/A    Medications reconciled and all medications are available in the home this visit. Medications are effective at this time.      Supplies by type and quantity ordered this visit include: N/A    Consulted medical director/attending physician regarding: N/A    Instructed patient/family/caregiver on 24-hour hospice availability and phone number.    Plan for next visit:  routine visit with continued EOL education and support

## 2024-04-17 NOTE — HOSPICE
Today's SW visit was to provide support and assess for any additional needs of SNF/pt.  Pt was recieved in her SNF room.  Pt appeared comfortable and dressed appropriately.  Care aide stated she had just finished bites of lunch, but was very thristy today.  Aide stated she drank 3 (8 oz) cups of water.  No visible signs of abuse/neglect noted.  Pt alert to self only.  Pt was able to verbalized to SW about her  and wanted to make sure he was ok.  It takes pt several seconds to be able to verbalize 5-7 words.  Pt's eyes are swollen, due to fluid retention, this is a change from SW visit on 04/12/24.  Pt denies pain.  Increased support will continue due to pt's decline.

## 2024-04-17 NOTE — HOSPICE
Patient/Caregiver/Family/Facility findings/issues during SN visit:  Patient minimally responsive to sternal rub, made low pitched grunting sound but did not open eyes or communicate with SN. Facility nurse reports patient was awake at breakfast time for a short period but went right back to sleep. Patient's face remains edematous, right hand with 2+ pitting edema. Patient has started having discomfort with repositioning and bathing. Facility nurse is aware to give Morphine to patient before repositioning or bathing. Facility nurse also stated she would trim patient's nails today as patient's secondary decision maker, Dottie Peace has requested this. SN informed Ms Peace hospice aide and hospice nurse are not allowed to do this. Patient is diabetic. SN informed Ms Peace of patient's current status which is no significant changes from last update. Patient is taking only bites of food at a time, usually at lunch. Patient has been without dialysis for 3 weeks and is putting out minimal amounts of urine. Medina catheter draining yellow urine via gravity to drainage bag. Dressing to sacrum CDI.Patient's behavior negative for any pain indicators during SN visit. PPS: 20%.    Medication refills ordered this visit: N/A    Medications reconciled and all medications are available in the home this visit. Medications are effective at this time.      Supplies by type and quantity ordered this visit include: N/A    Consulted medical director/attending physician regarding: N/A    Instructed patient/family/caregiver on 24-hour hospice availability and phone number.    Plan for next visit:  routine daily visit with continued EOL education and support

## 2024-04-18 ENCOUNTER — HOME CARE VISIT (OUTPATIENT)
Age: 64
End: 2024-04-18
Payer: MEDICARE

## 2024-04-18 PROCEDURE — G0155 HHCP-SVS OF CSW,EA 15 MIN: HCPCS

## 2024-04-18 NOTE — HOSPICE
Today's SW visit was conducted to assess pt and provide increased support as pt declines.  Pt was received in her SNF room, asleep in her bed.  SW startled pt when she tapped on the door.  Pt is dressed appropriately and there is no evidence of abuse/neglect.  Pt was alert to self only and unable to talk to SW although she did wave at SW.  SW spoke with kimberly Barlow RN.   states pt was able to see her  Jesús on Tuesday.  She stated that pt's son was able to get him to the room from  SNF room and wheel him to see his wife.  Pt questioned SW the other day about Jesús, but SW is unable to provide information on spouse.  RN stated she wasn't sure if pt had eaten today and she would have to find the aide for information.  RN stated that when she offered pt an Ensure drink, pt drank it quickly and without an issues.  RN stated that pt was able to make needs known to her about pain medications.  Pt refused pain meds and lidocaine patch as pt denied being in pain.  Pt not wearing O2 during SW visit.  SW asked pt if she would like to put O2 back on, but pt was unable to answer SW.  No other needs at this time.

## 2024-04-19 ENCOUNTER — HOME CARE VISIT (OUTPATIENT)
Age: 64
End: 2024-04-19
Payer: MEDICARE

## 2024-04-19 VITALS
SYSTOLIC BLOOD PRESSURE: 181 MMHG | OXYGEN SATURATION: 99 % | DIASTOLIC BLOOD PRESSURE: 103 MMHG | TEMPERATURE: 98.7 F | HEART RATE: 107 BPM | RESPIRATION RATE: 16 BRPM

## 2024-04-19 PROCEDURE — G0299 HHS/HOSPICE OF RN EA 15 MIN: HCPCS

## 2024-04-22 NOTE — HOSPICE
Patient/Caregiver/Family/Facility findings/issues during SN visit:  Patient hypertensive 149/96 with 2+ pitting edema to right forearm and hand. Arm propped on pillow. Face edematous. O2 @ 2L via NC, 100%. RR 12. Medina catheter draining very small amount of yellow urine. Patient grimacing and moaning when being touched. SN discssed with Gifty, facility LPN need to administer morphine before patient is repositioned or before being bathed and she verbalized understanding. Patient continues with poor appetite, only taking a few bites of food daily. PPS: 20%.    Medication refills ordered this visit: N/A    Medications reconciled and all medications are available in the home this visit. Medications are effective at this time.      Supplies by type and quantity ordered this visit include: N/A    Consulted medical director/attending physician regarding: N/A    Instructed patient/family/caregiver on 24-hour hospice availability and phone number.    Plan for next visit:  routine visit with continued EOL education and support

## 2024-04-23 NOTE — HOSPICE
Patient/Caregiver/Family/Facility findings/issues during SN visit:  Patient awake during visit but nonverbal. Patient remains hypertensive at 162/102. Right forearm and hand with 1+ pitting edema, propped on pillow.  Face remains edematous, though drastically less than yesterday. FLACC 0/10. Medina catheter found by aide to have fallen out. Medina catheter not be replaced due to patient not putting out more than 100 mL of urine daily. Hypoactive BS RLQ, RUQ, LLQ. Appetite remains poor.    Medication refills ordered this visit: N/A    Medications reconciled and all medications are available in the home this visit. Medications are effective at this time.      Supplies by type and quantity ordered this visit include: N/A    Consulted medical director/attending physician regarding: N/A    Instructed patient/family/caregiver on 24-hour hospice availability and phone number.    Plan for next visit:  routine visit with continued EOL education and support

## 2024-04-24 NOTE — HOSPICE
Patient/Caregiver/Family/Facility findings/issues during SN visit:  Patient continues to only take a few bites of food daily, fluid intake inadequate. Patient continues with hypertension and has 3+ pitting edema to right forearm and hand. No swelling to BLE. Face not edematous this visit. O2 @ 2L via NC, 100%. FLACC 0/10. Patient behavior negative for any pain indicators.    Medication refills ordered this visit: N/A    Medications reconciled and all medications are available in the home this visit. Medications are effective at this time.      Supplies by type and quantity ordered this visit include: N/A    Consulted medical director/attending physician regarding: N/A    Instructed patient/family/caregiver on 24-hour hospice availability and phone number.    Plan for next visit:  routine visit with continued EOL education and support

## 2024-04-24 NOTE — HOSPICE
Today's visit was to assess pt's decline and provide emotional support.  Pt was recieved in her SNF room.  She is dressed appropriately, clean, and appears comfortably sleeping.  Pt is wearing continous O2.  Pt's breaths appear to be more labored at this time, compared to last SW visit 3 days ago.  Pt unable to communicate and does not open eyes during SW visit.  Facility RN report minimal bites of foods offered.  Pt sleeping 22-23 hours a day.  Pt is recieving comfort meds as prescribed.  Pt's skin appears to be more gray and her face appears swollen from fluid buildup.  No other issues reported at this time.  Continue increased visits to assess pt's decline.

## 2024-04-26 NOTE — HOSPICE
Patient/Caregiver/Family/Facility findings/issues during SN visit:  No new significant changes reported. Patient continues with taking only bites of food daily and having minimal urine output. 1+ pitting edema to right hand. PPS: 20%.     Medication refills ordered this visit: N/A    Medications reconciled and all medications are available in the home this visit. Medications are effective at this time.      Supplies by type and quantity ordered this visit include: N/A    Consulted medical director/attending physician regarding: Patient taken off imminent list. SN visits changed to 3x weekly.    Instructed patient/family/caregiver on 24-hour hospice availability and phone number.    Plan for next visit:  comprehensive visit with continued EOL education and support

## 2024-04-26 NOTE — HOSPICE
Patient/Caregiver/Family/Facility findings/issues during SN visit:  Patient remains on daily visits. Patient nonresponsive to sternal rub by SN. FLACC 0/10. Face is not edematous but right hand  with 3+ pitting edema. Right arm is propped on pillow. Patient is pale in color. O2 @ 2L via NC at 100%. BP trending downward the last 3 days. BS absent RUQ, RLQ. BS hypoactive LUQ, LLQ.    Medication refills ordered this visit: N/A    Medications reconciled and all medications are available in the home this visit. Medications are effective at this time.      Supplies by type and quantity ordered this visit include: N/A    Consulted medical director/attending physician regarding: N/A    Instructed patient/family/caregiver on 24-hour hospice availability and phone number.    Plan for next visit: routine visit with continued EOL education and supportg

## 2025-03-04 NOTE — PROGRESS NOTES
Family arrived to bedside today, including MPOA Rakan Montalvo and the decision has been made by Rakan to transition to comfort measures only and discharge with hospice services, including stopping dialysis, removing NGT, and allowing for comfort feeds as tolerated. POST form signed by Rakan Montalvo. Comfort orders placed. Hospice consulted.    FE Irwin - NP     Medications